# Patient Record
Sex: MALE | Race: WHITE | NOT HISPANIC OR LATINO | Employment: FULL TIME | URBAN - METROPOLITAN AREA
[De-identification: names, ages, dates, MRNs, and addresses within clinical notes are randomized per-mention and may not be internally consistent; named-entity substitution may affect disease eponyms.]

---

## 2023-12-20 ENCOUNTER — HOSPITAL ENCOUNTER (EMERGENCY)
Facility: HOSPITAL | Age: 58
Discharge: HOME | End: 2023-12-20
Attending: STUDENT IN AN ORGANIZED HEALTH CARE EDUCATION/TRAINING PROGRAM
Payer: MEDICARE

## 2023-12-20 ENCOUNTER — APPOINTMENT (OUTPATIENT)
Dept: RADIOLOGY | Facility: HOSPITAL | Age: 58
End: 2023-12-20
Payer: MEDICARE

## 2023-12-20 VITALS
WEIGHT: 235 LBS | SYSTOLIC BLOOD PRESSURE: 165 MMHG | HEIGHT: 67 IN | TEMPERATURE: 97.3 F | BODY MASS INDEX: 36.88 KG/M2 | DIASTOLIC BLOOD PRESSURE: 101 MMHG | HEART RATE: 86 BPM | RESPIRATION RATE: 20 BRPM | OXYGEN SATURATION: 97 %

## 2023-12-20 DIAGNOSIS — M79.672 PAIN OF LEFT HEEL: Primary | ICD-10-CM

## 2023-12-20 PROCEDURE — 73630 X-RAY EXAM OF FOOT: CPT | Mod: LT

## 2023-12-20 PROCEDURE — 99284 EMERGENCY DEPT VISIT MOD MDM: CPT | Performed by: STUDENT IN AN ORGANIZED HEALTH CARE EDUCATION/TRAINING PROGRAM

## 2023-12-20 PROCEDURE — 99283 EMERGENCY DEPT VISIT LOW MDM: CPT | Mod: 25

## 2023-12-20 PROCEDURE — 2500000004 HC RX 250 GENERAL PHARMACY W/ HCPCS (ALT 636 FOR OP/ED): Performed by: STUDENT IN AN ORGANIZED HEALTH CARE EDUCATION/TRAINING PROGRAM

## 2023-12-20 PROCEDURE — 96372 THER/PROPH/DIAG INJ SC/IM: CPT

## 2023-12-20 PROCEDURE — 73630 X-RAY EXAM OF FOOT: CPT | Mod: LEFT SIDE | Performed by: RADIOLOGY

## 2023-12-20 RX ORDER — ORPHENADRINE CITRATE 100 MG/1
100 TABLET, EXTENDED RELEASE ORAL ONCE
Status: COMPLETED | OUTPATIENT
Start: 2023-12-20 | End: 2023-12-20

## 2023-12-20 RX ORDER — ACETAMINOPHEN 325 MG/1
650 TABLET ORAL ONCE
Status: COMPLETED | OUTPATIENT
Start: 2023-12-20 | End: 2023-12-20

## 2023-12-20 RX ORDER — KETOROLAC TROMETHAMINE 30 MG/ML
30 INJECTION, SOLUTION INTRAMUSCULAR; INTRAVENOUS ONCE
Status: COMPLETED | OUTPATIENT
Start: 2023-12-20 | End: 2023-12-20

## 2023-12-20 RX ADMIN — ORPHENADRINE CITRATE 100 MG: 100 TABLET, EXTENDED RELEASE ORAL at 03:18

## 2023-12-20 RX ADMIN — ACETAMINOPHEN 650 MG: 325 TABLET ORAL at 03:18

## 2023-12-20 RX ADMIN — KETOROLAC TROMETHAMINE 30 MG: 30 INJECTION, SOLUTION INTRAMUSCULAR at 03:18

## 2023-12-20 ASSESSMENT — LIFESTYLE VARIABLES
HAVE YOU EVER FELT YOU SHOULD CUT DOWN ON YOUR DRINKING: NO
REASON UNABLE TO ASSESS: NO
EVER FELT BAD OR GUILTY ABOUT YOUR DRINKING: NO
HAVE PEOPLE ANNOYED YOU BY CRITICIZING YOUR DRINKING: NO
EVER HAD A DRINK FIRST THING IN THE MORNING TO STEADY YOUR NERVES TO GET RID OF A HANGOVER: NO

## 2023-12-20 ASSESSMENT — COLUMBIA-SUICIDE SEVERITY RATING SCALE - C-SSRS
6. HAVE YOU EVER DONE ANYTHING, STARTED TO DO ANYTHING, OR PREPARED TO DO ANYTHING TO END YOUR LIFE?: NO
1. IN THE PAST MONTH, HAVE YOU WISHED YOU WERE DEAD OR WISHED YOU COULD GO TO SLEEP AND NOT WAKE UP?: NO
2. HAVE YOU ACTUALLY HAD ANY THOUGHTS OF KILLING YOURSELF?: NO

## 2023-12-20 NOTE — ED PROVIDER NOTES
Chief Complaint   Patient presents with    Foot Injury     Pt c/o left heel pain since Sunday. Denies injury or accident     History of Present Illness   58-year-old who presents to the emergency department due to left heel pain.  Patient reports his symptoms began on Sunday and have progressively worsened.  He does note that he has been working on a running in order to become more healthy and since this increase in activity he has began to have left heel pain with walking most notably when plantar flexing.  No acute traumatic event that he can recall.  No previous injuries to his left ankle.  He is able to ambulate however with some associated pain.    Physical Exam     ED Triage Vitals [12/20/23 0030]   Temp Heart Rate Resp BP   36.3 °C (97.3 °F) 88 20 (!) 180/109      SpO2 Temp src Heart Rate Source Patient Position   96 % -- -- --      BP Location FiO2 (%)     -- --       Vital signs reviewed in nursing triage note, EMR flow sheets, and at patient's bedside.  General:  No acute distress.  Head: Atraumatic.  Neck: Supple.  Eyes: No conjunctival injection.   Ears Nose Throat: Hearing grossly intact. No epistaxis. Oral mucosa moist.  Cardiovascular: Extremities warm. Regular rhythm.   Respiratory: No stridor. No conversational dyspnea.  Gastrointestinal: No abdominal distention.  Genitourinary: No CVA tenderness.  Musculoskeletal: 2+ DP and PT palpable in left foot no bony tenderness at the base of the fifth metatarsal along the midfoot or along the bilateral malleoli.  Posterior heel reveals some associated erythema and tenderness to palpation plantarflexion and dorsiflexion intact along with inversion and eversion.  Sensation grossly intact.  Skin: No rash.  Psychiatric: Does not appear to respond to internal stimuli.  Neurologic: Alert. Follows directions. Face symmetric. No aphasia or dysarthria. Symmetric movement of upper and lower extremities.         ED Course & Medical Decision Making   58-year-old male  past medical history as above presents emergency department due to heel pain.  Upon presentation he is hemodynamically stable and afebrile.  Physical exam reveals some associated erythema and tenderness and along the posterior aspect of the heel.  No other acute findings.  Both x-ray and physical exam are most consistent with Achilles tendinopathy.  No acute fracture, dislocation, vascular, or infectious etiology appreciated on exam.  Plan will be for symptomatic management and outpatient follow-up for which patient already has an appointment scheduled.            Jacinto Branch MD  Resident  12/20/23 [id number]     [facility]

## 2024-08-11 ENCOUNTER — APPOINTMENT (OUTPATIENT)
Dept: RADIOLOGY | Facility: HOSPITAL | Age: 59
End: 2024-08-11
Payer: MEDICARE

## 2024-08-11 ENCOUNTER — ANESTHESIA EVENT (OUTPATIENT)
Dept: OPERATING ROOM | Facility: HOSPITAL | Age: 59
End: 2024-08-11
Payer: MEDICARE

## 2024-08-11 ENCOUNTER — PREP FOR PROCEDURE (OUTPATIENT)
Dept: SURGERY | Facility: HOSPITAL | Age: 59
End: 2024-08-11
Payer: MEDICARE

## 2024-08-11 ENCOUNTER — PREP FOR PROCEDURE (OUTPATIENT)
Dept: SURGERY | Facility: HOSPITAL | Age: 59
End: 2024-08-11

## 2024-08-11 ENCOUNTER — ANESTHESIA (OUTPATIENT)
Dept: OPERATING ROOM | Facility: HOSPITAL | Age: 59
End: 2024-08-11
Payer: MEDICARE

## 2024-08-11 ENCOUNTER — APPOINTMENT (OUTPATIENT)
Dept: CARDIOLOGY | Facility: HOSPITAL | Age: 59
End: 2024-08-11
Payer: MEDICARE

## 2024-08-11 ENCOUNTER — HOSPITAL ENCOUNTER (OUTPATIENT)
Facility: HOSPITAL | Age: 59
Setting detail: OBSERVATION
End: 2024-08-11
Attending: EMERGENCY MEDICINE | Admitting: SURGERY
Payer: MEDICARE

## 2024-08-11 VITALS
HEART RATE: 104 BPM | WEIGHT: 240 LBS | BODY MASS INDEX: 37.67 KG/M2 | HEIGHT: 67 IN | DIASTOLIC BLOOD PRESSURE: 83 MMHG | OXYGEN SATURATION: 92 % | TEMPERATURE: 98.2 F | SYSTOLIC BLOOD PRESSURE: 151 MMHG | RESPIRATION RATE: 18 BRPM

## 2024-08-11 DIAGNOSIS — K80.00 ACUTE CALCULOUS CHOLECYSTITIS: ICD-10-CM

## 2024-08-11 DIAGNOSIS — K80.10 CALCULUS OF GALLBLADDER WITH CHRONIC CHOLECYSTITIS WITHOUT OBSTRUCTION: Primary | ICD-10-CM

## 2024-08-11 DIAGNOSIS — K80.50 BILIARY COLIC: Primary | ICD-10-CM

## 2024-08-11 DIAGNOSIS — K80.10 CALCULUS OF GALLBLADDER WITH CHRONIC CHOLECYSTITIS WITHOUT OBSTRUCTION: ICD-10-CM

## 2024-08-11 DIAGNOSIS — K80.51 CALCULUS OF BILE DUCT WITHOUT CHOLECYSTITIS WITH OBSTRUCTION: ICD-10-CM

## 2024-08-11 LAB
ALBUMIN SERPL BCP-MCNC: 4.4 G/DL (ref 3.4–5)
ALP SERPL-CCNC: 50 U/L (ref 33–120)
ALT SERPL W P-5'-P-CCNC: 20 U/L (ref 10–52)
ANION GAP SERPL CALC-SCNC: 14 MMOL/L (ref 10–20)
APPEARANCE UR: CLEAR
AST SERPL W P-5'-P-CCNC: 20 U/L (ref 9–39)
BASOPHILS # BLD AUTO: 0.01 X10*3/UL (ref 0–0.1)
BASOPHILS NFR BLD AUTO: 0.1 %
BILIRUB SERPL-MCNC: 1 MG/DL (ref 0–1.2)
BILIRUB UR STRIP.AUTO-MCNC: NEGATIVE MG/DL
BUN SERPL-MCNC: 12 MG/DL (ref 6–23)
CALCIUM SERPL-MCNC: 9.4 MG/DL (ref 8.6–10.3)
CHLORIDE SERPL-SCNC: 100 MMOL/L (ref 98–107)
CO2 SERPL-SCNC: 26 MMOL/L (ref 21–32)
COLOR UR: YELLOW
CREAT SERPL-MCNC: 0.86 MG/DL (ref 0.5–1.3)
EGFRCR SERPLBLD CKD-EPI 2021: >90 ML/MIN/1.73M*2
EOSINOPHIL # BLD AUTO: 0.01 X10*3/UL (ref 0–0.7)
EOSINOPHIL NFR BLD AUTO: 0.1 %
ERYTHROCYTE [DISTWIDTH] IN BLOOD BY AUTOMATED COUNT: 13.2 % (ref 11.5–14.5)
GLUCOSE SERPL-MCNC: 136 MG/DL (ref 74–99)
GLUCOSE UR STRIP.AUTO-MCNC: NEGATIVE MG/DL
HCT VFR BLD AUTO: 47.5 % (ref 41–52)
HGB BLD-MCNC: 16 G/DL (ref 13.5–17.5)
IMM GRANULOCYTES # BLD AUTO: 0.04 X10*3/UL (ref 0–0.7)
IMM GRANULOCYTES NFR BLD AUTO: 0.4 % (ref 0–0.9)
KETONES UR STRIP.AUTO-MCNC: ABNORMAL MG/DL
LEUKOCYTE ESTERASE UR QL STRIP.AUTO: NEGATIVE
LIPASE SERPL-CCNC: 38 U/L (ref 9–82)
LYMPHOCYTES # BLD AUTO: 0.93 X10*3/UL (ref 1.2–4.8)
LYMPHOCYTES NFR BLD AUTO: 10.1 %
MCH RBC QN AUTO: 30.7 PG (ref 26–34)
MCHC RBC AUTO-ENTMCNC: 33.7 G/DL (ref 32–36)
MCV RBC AUTO: 91 FL (ref 80–100)
MONOCYTES # BLD AUTO: 0.89 X10*3/UL (ref 0.1–1)
MONOCYTES NFR BLD AUTO: 9.6 %
NEUTROPHILS # BLD AUTO: 7.36 X10*3/UL (ref 1.2–7.7)
NEUTROPHILS NFR BLD AUTO: 79.7 %
NITRITE UR QL STRIP.AUTO: NEGATIVE
NRBC BLD-RTO: 0 /100 WBCS (ref 0–0)
PH UR STRIP.AUTO: 6 [PH]
PLATELET # BLD AUTO: 243 X10*3/UL (ref 150–450)
POTASSIUM SERPL-SCNC: 4 MMOL/L (ref 3.5–5.3)
PROT SERPL-MCNC: 7.6 G/DL (ref 6.4–8.2)
PROT UR STRIP.AUTO-MCNC: ABNORMAL MG/DL
RBC # BLD AUTO: 5.21 X10*6/UL (ref 4.5–5.9)
RBC # UR STRIP.AUTO: ABNORMAL /UL
RBC #/AREA URNS AUTO: ABNORMAL /HPF
SODIUM SERPL-SCNC: 136 MMOL/L (ref 136–145)
SP GR UR STRIP.AUTO: 1.02
SQUAMOUS #/AREA URNS AUTO: ABNORMAL /HPF
UROBILINOGEN UR STRIP.AUTO-MCNC: ABNORMAL MG/DL
WBC # BLD AUTO: 9.2 X10*3/UL (ref 4.4–11.3)
WBC #/AREA URNS AUTO: ABNORMAL /HPF
YEAST BUDDING #/AREA UR COMP ASSIST: PRESENT /HPF

## 2024-08-11 PROCEDURE — 2550000001 HC RX 255 CONTRASTS: Performed by: EMERGENCY MEDICINE

## 2024-08-11 PROCEDURE — 85025 COMPLETE CBC W/AUTO DIFF WBC: CPT

## 2024-08-11 PROCEDURE — 2780000003 HC OR 278 NO HCPCS: Performed by: SURGERY

## 2024-08-11 PROCEDURE — 2500000004 HC RX 250 GENERAL PHARMACY W/ HCPCS (ALT 636 FOR OP/ED)

## 2024-08-11 PROCEDURE — 47562 LAPAROSCOPIC CHOLECYSTECTOMY: CPT | Performed by: SURGERY

## 2024-08-11 PROCEDURE — 96372 THER/PROPH/DIAG INJ SC/IM: CPT | Performed by: SURGERY

## 2024-08-11 PROCEDURE — 3600000008 HC OR TIME - EACH INCREMENTAL 1 MINUTE - PROCEDURE LEVEL THREE: Performed by: SURGERY

## 2024-08-11 PROCEDURE — 96376 TX/PRO/DX INJ SAME DRUG ADON: CPT | Mod: 59

## 2024-08-11 PROCEDURE — 36415 COLL VENOUS BLD VENIPUNCTURE: CPT

## 2024-08-11 PROCEDURE — 3600000003 HC OR TIME - INITIAL BASE CHARGE - PROCEDURE LEVEL THREE: Performed by: SURGERY

## 2024-08-11 PROCEDURE — 7100000001 HC RECOVERY ROOM TIME - INITIAL BASE CHARGE: Performed by: SURGERY

## 2024-08-11 PROCEDURE — 80053 COMPREHEN METABOLIC PANEL: CPT

## 2024-08-11 PROCEDURE — C1729 CATH, DRAINAGE: HCPCS | Performed by: SURGERY

## 2024-08-11 PROCEDURE — 2500000004 HC RX 250 GENERAL PHARMACY W/ HCPCS (ALT 636 FOR OP/ED): Performed by: ANESTHESIOLOGY

## 2024-08-11 PROCEDURE — 2500000004 HC RX 250 GENERAL PHARMACY W/ HCPCS (ALT 636 FOR OP/ED): Performed by: SURGERY

## 2024-08-11 PROCEDURE — 2500000005 HC RX 250 GENERAL PHARMACY W/O HCPCS: Performed by: SURGERY

## 2024-08-11 PROCEDURE — 93005 ELECTROCARDIOGRAM TRACING: CPT | Mod: 59

## 2024-08-11 PROCEDURE — 74177 CT ABD & PELVIS W/CONTRAST: CPT | Performed by: RADIOLOGY

## 2024-08-11 PROCEDURE — 2500000005 HC RX 250 GENERAL PHARMACY W/O HCPCS: Performed by: ANESTHESIOLOGIST ASSISTANT

## 2024-08-11 PROCEDURE — 81001 URINALYSIS AUTO W/SCOPE: CPT

## 2024-08-11 PROCEDURE — 2500000001 HC RX 250 WO HCPCS SELF ADMINISTERED DRUGS (ALT 637 FOR MEDICARE OP)

## 2024-08-11 PROCEDURE — 76705 ECHO EXAM OF ABDOMEN: CPT | Mod: FOREIGN READ | Performed by: RADIOLOGY

## 2024-08-11 PROCEDURE — 71260 CT THORAX DX C+: CPT | Performed by: RADIOLOGY

## 2024-08-11 PROCEDURE — 74177 CT ABD & PELVIS W/CONTRAST: CPT

## 2024-08-11 PROCEDURE — 2500000001 HC RX 250 WO HCPCS SELF ADMINISTERED DRUGS (ALT 637 FOR MEDICARE OP): Performed by: SURGERY

## 2024-08-11 PROCEDURE — 87116 MYCOBACTERIA CULTURE: CPT | Mod: PARLAB | Performed by: SURGERY

## 2024-08-11 PROCEDURE — 3700000002 HC GENERAL ANESTHESIA TIME - EACH INCREMENTAL 1 MINUTE: Performed by: SURGERY

## 2024-08-11 PROCEDURE — 83690 ASSAY OF LIPASE: CPT

## 2024-08-11 PROCEDURE — 3700000001 HC GENERAL ANESTHESIA TIME - INITIAL BASE CHARGE: Performed by: SURGERY

## 2024-08-11 PROCEDURE — 99285 EMERGENCY DEPT VISIT HI MDM: CPT

## 2024-08-11 PROCEDURE — 2720000007 HC OR 272 NO HCPCS: Performed by: SURGERY

## 2024-08-11 PROCEDURE — 99223 1ST HOSP IP/OBS HIGH 75: CPT | Performed by: SURGERY

## 2024-08-11 PROCEDURE — C9113 INJ PANTOPRAZOLE SODIUM, VIA: HCPCS

## 2024-08-11 PROCEDURE — G0378 HOSPITAL OBSERVATION PER HR: HCPCS

## 2024-08-11 PROCEDURE — 96375 TX/PRO/DX INJ NEW DRUG ADDON: CPT | Mod: 59

## 2024-08-11 PROCEDURE — 2500000004 HC RX 250 GENERAL PHARMACY W/ HCPCS (ALT 636 FOR OP/ED): Performed by: ANESTHESIOLOGIST ASSISTANT

## 2024-08-11 PROCEDURE — 76705 ECHO EXAM OF ABDOMEN: CPT

## 2024-08-11 PROCEDURE — 88304 TISSUE EXAM BY PATHOLOGIST: CPT | Mod: TC,PARLAB | Performed by: SURGERY

## 2024-08-11 PROCEDURE — 7100000002 HC RECOVERY ROOM TIME - EACH INCREMENTAL 1 MINUTE: Performed by: SURGERY

## 2024-08-11 RX ORDER — ONDANSETRON HYDROCHLORIDE 2 MG/ML
4 INJECTION, SOLUTION INTRAVENOUS ONCE
Status: COMPLETED | OUTPATIENT
Start: 2024-08-11 | End: 2024-08-11

## 2024-08-11 RX ORDER — SCOLOPAMINE TRANSDERMAL SYSTEM 1 MG/1
1 PATCH, EXTENDED RELEASE TRANSDERMAL ONCE
Status: DISCONTINUED | OUTPATIENT
Start: 2024-08-11 | End: 2024-08-11 | Stop reason: HOSPADM

## 2024-08-11 RX ORDER — ASPIRIN 81 MG/1
162 TABLET ORAL DAILY
Status: ON HOLD | COMMUNITY

## 2024-08-11 RX ORDER — MORPHINE SULFATE 4 MG/ML
4 INJECTION, SOLUTION INTRAMUSCULAR; INTRAVENOUS ONCE
Status: COMPLETED | OUTPATIENT
Start: 2024-08-11 | End: 2024-08-11

## 2024-08-11 RX ORDER — LABETALOL HYDROCHLORIDE 5 MG/ML
10 INJECTION, SOLUTION INTRAVENOUS ONCE AS NEEDED
Status: DISCONTINUED | OUTPATIENT
Start: 2024-08-11 | End: 2024-08-11 | Stop reason: HOSPADM

## 2024-08-11 RX ORDER — ONDANSETRON HYDROCHLORIDE 2 MG/ML
4 INJECTION, SOLUTION INTRAVENOUS EVERY 8 HOURS PRN
Status: ACTIVE | OUTPATIENT
Start: 2024-08-11

## 2024-08-11 RX ORDER — ROCURONIUM BROMIDE 10 MG/ML
INJECTION, SOLUTION INTRAVENOUS AS NEEDED
Status: DISCONTINUED | OUTPATIENT
Start: 2024-08-11 | End: 2024-08-11

## 2024-08-11 RX ORDER — DICYCLOMINE HYDROCHLORIDE 10 MG/1
10 CAPSULE ORAL ONCE
Status: COMPLETED | OUTPATIENT
Start: 2024-08-11 | End: 2024-08-11

## 2024-08-11 RX ORDER — MORPHINE SULFATE 2 MG/ML
2 INJECTION, SOLUTION INTRAMUSCULAR; INTRAVENOUS EVERY 5 MIN PRN
Status: DISCONTINUED | OUTPATIENT
Start: 2024-08-11 | End: 2024-08-11 | Stop reason: HOSPADM

## 2024-08-11 RX ORDER — HEPARIN SODIUM 5000 [USP'U]/ML
5000 INJECTION, SOLUTION INTRAVENOUS; SUBCUTANEOUS EVERY 8 HOURS
Status: DISPENSED | OUTPATIENT
Start: 2024-08-11

## 2024-08-11 RX ORDER — MEPERIDINE HYDROCHLORIDE 25 MG/ML
INJECTION INTRAMUSCULAR; INTRAVENOUS; SUBCUTANEOUS AS NEEDED
Status: DISCONTINUED | OUTPATIENT
Start: 2024-08-11 | End: 2024-08-11

## 2024-08-11 RX ORDER — HYDROMORPHONE HYDROCHLORIDE 1 MG/ML
1 INJECTION, SOLUTION INTRAMUSCULAR; INTRAVENOUS; SUBCUTANEOUS EVERY 5 MIN PRN
Status: DISCONTINUED | OUTPATIENT
Start: 2024-08-11 | End: 2024-08-11 | Stop reason: HOSPADM

## 2024-08-11 RX ORDER — DOCUSATE SODIUM 100 MG/1
100 CAPSULE, LIQUID FILLED ORAL 2 TIMES DAILY
Status: DISPENSED | OUTPATIENT
Start: 2024-08-11

## 2024-08-11 RX ORDER — ALBUTEROL SULFATE 0.83 MG/ML
2.5 SOLUTION RESPIRATORY (INHALATION) ONCE AS NEEDED
Status: DISCONTINUED | OUTPATIENT
Start: 2024-08-11 | End: 2024-08-11 | Stop reason: HOSPADM

## 2024-08-11 RX ORDER — PROMETHAZINE HYDROCHLORIDE 25 MG/1
25 SUPPOSITORY RECTAL EVERY 12 HOURS PRN
Status: DISPENSED | OUTPATIENT
Start: 2024-08-11

## 2024-08-11 RX ORDER — FAMOTIDINE 10 MG/ML
20 INJECTION INTRAVENOUS 2 TIMES DAILY
Status: ACTIVE | OUTPATIENT
Start: 2024-08-11

## 2024-08-11 RX ORDER — HYDROMORPHONE HYDROCHLORIDE 1 MG/ML
1 INJECTION, SOLUTION INTRAMUSCULAR; INTRAVENOUS; SUBCUTANEOUS
Status: ACTIVE | OUTPATIENT
Start: 2024-08-11

## 2024-08-11 RX ORDER — NALOXONE HYDROCHLORIDE 1 MG/ML
0.2 INJECTION INTRAMUSCULAR; INTRAVENOUS; SUBCUTANEOUS EVERY 5 MIN PRN
Status: ACTIVE | OUTPATIENT
Start: 2024-08-11

## 2024-08-11 RX ORDER — ACETAMINOPHEN 325 MG/1
650 TABLET ORAL EVERY 6 HOURS
Status: DISPENSED | OUTPATIENT
Start: 2024-08-11

## 2024-08-11 RX ORDER — BUPIVACAINE HCL/EPINEPHRINE 0.5-1:200K
VIAL (ML) INJECTION AS NEEDED
Status: DISCONTINUED | OUTPATIENT
Start: 2024-08-11 | End: 2024-08-11 | Stop reason: HOSPADM

## 2024-08-11 RX ORDER — ONDANSETRON HYDROCHLORIDE 2 MG/ML
4 INJECTION, SOLUTION INTRAVENOUS ONCE AS NEEDED
Status: DISCONTINUED | OUTPATIENT
Start: 2024-08-11 | End: 2024-08-11 | Stop reason: HOSPADM

## 2024-08-11 RX ORDER — LIDOCAINE HCL/PF 100 MG/5ML
SYRINGE (ML) INTRAVENOUS AS NEEDED
Status: DISCONTINUED | OUTPATIENT
Start: 2024-08-11 | End: 2024-08-11

## 2024-08-11 RX ORDER — ONDANSETRON HYDROCHLORIDE 2 MG/ML
INJECTION, SOLUTION INTRAVENOUS AS NEEDED
Status: DISCONTINUED | OUTPATIENT
Start: 2024-08-11 | End: 2024-08-11

## 2024-08-11 RX ORDER — MIDAZOLAM HYDROCHLORIDE 1 MG/ML
1 INJECTION, SOLUTION INTRAMUSCULAR; INTRAVENOUS ONCE AS NEEDED
Status: DISCONTINUED | OUTPATIENT
Start: 2024-08-11 | End: 2024-08-11 | Stop reason: HOSPADM

## 2024-08-11 RX ORDER — MORPHINE SULFATE 4 MG/ML
INJECTION, SOLUTION INTRAMUSCULAR; INTRAVENOUS
Status: COMPLETED
Start: 2024-08-11 | End: 2024-08-11

## 2024-08-11 RX ORDER — FAMOTIDINE 10 MG/ML
20 INJECTION INTRAVENOUS ONCE
Status: DISCONTINUED | OUTPATIENT
Start: 2024-08-11 | End: 2024-08-11 | Stop reason: HOSPADM

## 2024-08-11 RX ORDER — DICLOFENAC SODIUM 10 MG/G
4 GEL TOPICAL 4 TIMES DAILY PRN
Status: ON HOLD | COMMUNITY

## 2024-08-11 RX ORDER — SODIUM CHLORIDE, SODIUM LACTATE, POTASSIUM CHLORIDE, CALCIUM CHLORIDE 600; 310; 30; 20 MG/100ML; MG/100ML; MG/100ML; MG/100ML
100 INJECTION, SOLUTION INTRAVENOUS CONTINUOUS
Status: DISCONTINUED | OUTPATIENT
Start: 2024-08-11 | End: 2024-08-11 | Stop reason: HOSPADM

## 2024-08-11 RX ORDER — PROPOFOL 10 MG/ML
INJECTION, EMULSION INTRAVENOUS AS NEEDED
Status: DISCONTINUED | OUTPATIENT
Start: 2024-08-11 | End: 2024-08-11

## 2024-08-11 RX ORDER — DIPHENHYDRAMINE HYDROCHLORIDE 50 MG/ML
25 INJECTION INTRAMUSCULAR; INTRAVENOUS ONCE AS NEEDED
Status: DISCONTINUED | OUTPATIENT
Start: 2024-08-11 | End: 2024-08-11 | Stop reason: HOSPADM

## 2024-08-11 RX ORDER — PROMETHAZINE HYDROCHLORIDE 25 MG/1
25 TABLET ORAL EVERY 6 HOURS PRN
Status: ACTIVE | OUTPATIENT
Start: 2024-08-11

## 2024-08-11 RX ORDER — ACETAMINOPHEN 325 MG/1
975 TABLET ORAL ONCE
Status: DISCONTINUED | OUTPATIENT
Start: 2024-08-11 | End: 2024-08-11 | Stop reason: HOSPADM

## 2024-08-11 RX ORDER — MIDAZOLAM HYDROCHLORIDE 1 MG/ML
INJECTION, SOLUTION INTRAMUSCULAR; INTRAVENOUS AS NEEDED
Status: DISCONTINUED | OUTPATIENT
Start: 2024-08-11 | End: 2024-08-11

## 2024-08-11 RX ORDER — SODIUM CHLORIDE, SODIUM LACTATE, POTASSIUM CHLORIDE, CALCIUM CHLORIDE 600; 310; 30; 20 MG/100ML; MG/100ML; MG/100ML; MG/100ML
100 INJECTION, SOLUTION INTRAVENOUS CONTINUOUS
Status: ACTIVE | OUTPATIENT
Start: 2024-08-11

## 2024-08-11 RX ORDER — CEFAZOLIN SODIUM 2 G/100ML
INJECTION, SOLUTION INTRAVENOUS
Status: DISPENSED
Start: 2024-08-11 | End: 2024-08-12

## 2024-08-11 RX ORDER — MULTIVIT-MIN/IRON FUM/FOLIC AC 7.5 MG-4
1 TABLET ORAL DAILY
Status: ON HOLD | COMMUNITY

## 2024-08-11 RX ORDER — PANTOPRAZOLE SODIUM 40 MG/10ML
40 INJECTION, POWDER, LYOPHILIZED, FOR SOLUTION INTRAVENOUS ONCE
Status: COMPLETED | OUTPATIENT
Start: 2024-08-11 | End: 2024-08-11

## 2024-08-11 RX ORDER — ONDANSETRON 4 MG/1
4 TABLET, ORALLY DISINTEGRATING ORAL EVERY 8 HOURS PRN
Status: ACTIVE | OUTPATIENT
Start: 2024-08-11

## 2024-08-11 RX ORDER — LABETALOL HYDROCHLORIDE 5 MG/ML
INJECTION, SOLUTION INTRAVENOUS AS NEEDED
Status: DISCONTINUED | OUTPATIENT
Start: 2024-08-11 | End: 2024-08-11

## 2024-08-11 RX ORDER — CEFAZOLIN 1 G/1
INJECTION, POWDER, FOR SOLUTION INTRAVENOUS AS NEEDED
Status: DISCONTINUED | OUTPATIENT
Start: 2024-08-11 | End: 2024-08-11

## 2024-08-11 RX ORDER — FENTANYL CITRATE 50 UG/ML
INJECTION, SOLUTION INTRAMUSCULAR; INTRAVENOUS AS NEEDED
Status: DISCONTINUED | OUTPATIENT
Start: 2024-08-11 | End: 2024-08-11

## 2024-08-11 RX ORDER — MEPERIDINE HYDROCHLORIDE 25 MG/ML
12.5 INJECTION INTRAMUSCULAR; INTRAVENOUS; SUBCUTANEOUS EVERY 10 MIN PRN
Status: DISCONTINUED | OUTPATIENT
Start: 2024-08-11 | End: 2024-08-11 | Stop reason: HOSPADM

## 2024-08-11 RX ORDER — IBUPROFEN 600 MG/1
600 TABLET ORAL EVERY 6 HOURS SCHEDULED
Status: DISPENSED | OUTPATIENT
Start: 2024-08-12

## 2024-08-11 RX ORDER — HYDRALAZINE HYDROCHLORIDE 20 MG/ML
10 INJECTION INTRAMUSCULAR; INTRAVENOUS EVERY 30 MIN PRN
Status: DISCONTINUED | OUTPATIENT
Start: 2024-08-11 | End: 2024-08-11 | Stop reason: HOSPADM

## 2024-08-11 RX ORDER — METOPROLOL TARTRATE 1 MG/ML
5 INJECTION, SOLUTION INTRAVENOUS ONCE
Status: DISCONTINUED | OUTPATIENT
Start: 2024-08-11 | End: 2024-08-11 | Stop reason: HOSPADM

## 2024-08-11 RX ORDER — CEFAZOLIN SODIUM 2 G/100ML
2 INJECTION, SOLUTION INTRAVENOUS EVERY 8 HOURS
Status: ACTIVE | OUTPATIENT
Start: 2024-08-12

## 2024-08-11 RX ORDER — FAMOTIDINE 20 MG/1
20 TABLET, FILM COATED ORAL 2 TIMES DAILY
Status: DISPENSED | OUTPATIENT
Start: 2024-08-11

## 2024-08-11 RX ADMIN — LIDOCAINE HYDROCHLORIDE 100 MG: 20 INJECTION INTRAVENOUS at 18:22

## 2024-08-11 RX ADMIN — IBUPROFEN 600 MG: 600 TABLET, FILM COATED ORAL at 23:43

## 2024-08-11 RX ADMIN — LABETALOL HYDROCHLORIDE 5 MG: 5 INJECTION, SOLUTION INTRAVENOUS at 19:05

## 2024-08-11 RX ADMIN — SODIUM CHLORIDE, POTASSIUM CHLORIDE, SODIUM LACTATE AND CALCIUM CHLORIDE: 600; 310; 30; 20 INJECTION, SOLUTION INTRAVENOUS at 18:13

## 2024-08-11 RX ADMIN — ONDANSETRON 4 MG: 2 INJECTION INTRAMUSCULAR; INTRAVENOUS at 19:39

## 2024-08-11 RX ADMIN — MEPERIDINE HYDROCHLORIDE 25 MG: 25 INJECTION INTRAMUSCULAR; INTRAVENOUS; SUBCUTANEOUS at 19:39

## 2024-08-11 RX ADMIN — SODIUM CHLORIDE, POTASSIUM CHLORIDE, SODIUM LACTATE AND CALCIUM CHLORIDE 100 ML/HR: 600; 310; 30; 20 INJECTION, SOLUTION INTRAVENOUS at 22:43

## 2024-08-11 RX ADMIN — SUGAMMADEX 200 MG: 100 INJECTION, SOLUTION INTRAVENOUS at 19:39

## 2024-08-11 RX ADMIN — ONDANSETRON 4 MG: 2 INJECTION INTRAMUSCULAR; INTRAVENOUS at 12:28

## 2024-08-11 RX ADMIN — ROCURONIUM BROMIDE 50 MG: 10 INJECTION, SOLUTION INTRAVENOUS at 18:22

## 2024-08-11 RX ADMIN — MEPERIDINE HYDROCHLORIDE 12.5 MG: 25 INJECTION INTRAMUSCULAR; INTRAVENOUS; SUBCUTANEOUS at 20:36

## 2024-08-11 RX ADMIN — DOCUSATE SODIUM 100 MG: 100 CAPSULE, LIQUID FILLED ORAL at 22:45

## 2024-08-11 RX ADMIN — MORPHINE SULFATE 4 MG: 4 INJECTION, SOLUTION INTRAMUSCULAR; INTRAVENOUS at 12:28

## 2024-08-11 RX ADMIN — ACETAMINOPHEN 650 MG: 325 TABLET ORAL at 22:45

## 2024-08-11 RX ADMIN — CEFAZOLIN 2 G: 330 INJECTION, POWDER, FOR SOLUTION INTRAMUSCULAR; INTRAVENOUS at 18:30

## 2024-08-11 RX ADMIN — ROCURONIUM BROMIDE 10 MG: 10 INJECTION, SOLUTION INTRAVENOUS at 19:00

## 2024-08-11 RX ADMIN — SODIUM CHLORIDE, POTASSIUM CHLORIDE, SODIUM LACTATE AND CALCIUM CHLORIDE: 600; 310; 30; 20 INJECTION, SOLUTION INTRAVENOUS at 19:39

## 2024-08-11 RX ADMIN — IOHEXOL 75 ML: 350 INJECTION, SOLUTION INTRAVENOUS at 13:10

## 2024-08-11 RX ADMIN — MEPERIDINE HYDROCHLORIDE 12.5 MG: 25 INJECTION INTRAMUSCULAR; INTRAVENOUS; SUBCUTANEOUS at 20:26

## 2024-08-11 RX ADMIN — HYDROMORPHONE HYDROCHLORIDE 1 MG: 1 INJECTION, SOLUTION INTRAMUSCULAR; INTRAVENOUS; SUBCUTANEOUS at 21:24

## 2024-08-11 RX ADMIN — PROPOFOL 200 MG: 10 INJECTION, EMULSION INTRAVENOUS at 18:22

## 2024-08-11 RX ADMIN — FENTANYL CITRATE 100 MCG: 50 INJECTION, SOLUTION INTRAMUSCULAR; INTRAVENOUS at 18:22

## 2024-08-11 RX ADMIN — HYDROMORPHONE HYDROCHLORIDE 1 MG: 1 INJECTION, SOLUTION INTRAMUSCULAR; INTRAVENOUS; SUBCUTANEOUS at 20:38

## 2024-08-11 RX ADMIN — MORPHINE SULFATE 4 MG: 4 INJECTION, SOLUTION INTRAMUSCULAR; INTRAVENOUS at 16:32

## 2024-08-11 RX ADMIN — SODIUM CHLORIDE, POTASSIUM CHLORIDE, SODIUM LACTATE AND CALCIUM CHLORIDE 100 ML/HR: 600; 310; 30; 20 INJECTION, SOLUTION INTRAVENOUS at 21:42

## 2024-08-11 RX ADMIN — FENTANYL CITRATE 50 MCG: 50 INJECTION, SOLUTION INTRAMUSCULAR; INTRAVENOUS at 18:40

## 2024-08-11 RX ADMIN — HYDROMORPHONE HYDROCHLORIDE 1 MG: 1 INJECTION, SOLUTION INTRAMUSCULAR; INTRAVENOUS; SUBCUTANEOUS at 20:56

## 2024-08-11 RX ADMIN — FENTANYL CITRATE 50 MCG: 50 INJECTION, SOLUTION INTRAMUSCULAR; INTRAVENOUS at 18:59

## 2024-08-11 RX ADMIN — HEPARIN SODIUM 5000 UNITS: 5000 INJECTION INTRAVENOUS; SUBCUTANEOUS at 22:46

## 2024-08-11 RX ADMIN — DEXAMETHASONE SODIUM PHOSPHATE 4 MG: 4 INJECTION, SOLUTION INTRAMUSCULAR; INTRAVENOUS at 18:42

## 2024-08-11 RX ADMIN — MIDAZOLAM 2 MG: 1 INJECTION INTRAMUSCULAR; INTRAVENOUS at 18:15

## 2024-08-11 RX ADMIN — ROCURONIUM BROMIDE 10 MG: 10 INJECTION, SOLUTION INTRAVENOUS at 18:47

## 2024-08-11 RX ADMIN — FAMOTIDINE 20 MG: 20 TABLET ORAL at 22:45

## 2024-08-11 RX ADMIN — DICYCLOMINE HYDROCHLORIDE 10 MG: 10 CAPSULE ORAL at 11:24

## 2024-08-11 RX ADMIN — PANTOPRAZOLE SODIUM 40 MG: 40 INJECTION, POWDER, FOR SOLUTION INTRAVENOUS at 11:23

## 2024-08-11 SDOH — SOCIAL STABILITY: SOCIAL INSECURITY: HAVE YOU HAD ANY THOUGHTS OF HARMING ANYONE ELSE?: NO

## 2024-08-11 SDOH — SOCIAL STABILITY: SOCIAL INSECURITY: DO YOU FEEL UNSAFE GOING BACK TO THE PLACE WHERE YOU ARE LIVING?: NO

## 2024-08-11 SDOH — SOCIAL STABILITY: SOCIAL INSECURITY: WERE YOU ABLE TO COMPLETE ALL THE BEHAVIORAL HEALTH SCREENINGS?: YES

## 2024-08-11 SDOH — SOCIAL STABILITY: SOCIAL INSECURITY: ABUSE: ADULT

## 2024-08-11 SDOH — SOCIAL STABILITY: SOCIAL INSECURITY: ARE THERE ANY APPARENT SIGNS OF INJURIES/BEHAVIORS THAT COULD BE RELATED TO ABUSE/NEGLECT?: NO

## 2024-08-11 SDOH — HEALTH STABILITY: MENTAL HEALTH: CURRENT SMOKER: 0

## 2024-08-11 SDOH — SOCIAL STABILITY: SOCIAL INSECURITY: DOES ANYONE TRY TO KEEP YOU FROM HAVING/CONTACTING OTHER FRIENDS OR DOING THINGS OUTSIDE YOUR HOME?: NO

## 2024-08-11 SDOH — SOCIAL STABILITY: SOCIAL INSECURITY: HAVE YOU HAD THOUGHTS OF HARMING ANYONE ELSE?: NO

## 2024-08-11 SDOH — SOCIAL STABILITY: SOCIAL INSECURITY: HAS ANYONE EVER THREATENED TO HURT YOUR FAMILY OR YOUR PETS?: NO

## 2024-08-11 SDOH — SOCIAL STABILITY: SOCIAL INSECURITY: DO YOU FEEL ANYONE HAS EXPLOITED OR TAKEN ADVANTAGE OF YOU FINANCIALLY OR OF YOUR PERSONAL PROPERTY?: NO

## 2024-08-11 SDOH — SOCIAL STABILITY: SOCIAL INSECURITY: ARE YOU OR HAVE YOU BEEN THREATENED OR ABUSED PHYSICALLY, EMOTIONALLY, OR SEXUALLY BY ANYONE?: NO

## 2024-08-11 ASSESSMENT — ACTIVITIES OF DAILY LIVING (ADL)
LACK_OF_TRANSPORTATION: NO
JUDGMENT_ADEQUATE_SAFELY_COMPLETE_DAILY_ACTIVITIES: YES
PATIENT'S MEMORY ADEQUATE TO SAFELY COMPLETE DAILY ACTIVITIES?: YES
FEEDING YOURSELF: INDEPENDENT
DRESSING YOURSELF: INDEPENDENT
HEARING - RIGHT EAR: FUNCTIONAL
ADEQUATE_TO_COMPLETE_ADL: YES
GROOMING: INDEPENDENT
TOILETING: INDEPENDENT
BATHING: INDEPENDENT
WALKS IN HOME: INDEPENDENT
HEARING - LEFT EAR: FUNCTIONAL

## 2024-08-11 ASSESSMENT — PAIN SCALES - GENERAL
PAINLEVEL_OUTOF10: 9
PAINLEVEL_OUTOF10: 6
PAINLEVEL_OUTOF10: 6
PAINLEVEL_OUTOF10: 7
PAINLEVEL_OUTOF10: 0 - NO PAIN
PAINLEVEL_OUTOF10: 3
PAINLEVEL_OUTOF10: 7
PAINLEVEL_OUTOF10: 5 - MODERATE PAIN
PAINLEVEL_OUTOF10: 0 - NO PAIN
PAINLEVEL_OUTOF10: 7
PAINLEVEL_OUTOF10: 10 - WORST POSSIBLE PAIN
PAIN_LEVEL: 0

## 2024-08-11 ASSESSMENT — COGNITIVE AND FUNCTIONAL STATUS - GENERAL
DRESSING REGULAR UPPER BODY CLOTHING: A LITTLE
PATIENT BASELINE BEDBOUND: NO
MOBILITY SCORE: 18
CLIMB 3 TO 5 STEPS WITH RAILING: A LITTLE
DRESSING REGULAR LOWER BODY CLOTHING: A LITTLE
MOVING FROM LYING ON BACK TO SITTING ON SIDE OF FLAT BED WITH BEDRAILS: A LITTLE
HELP NEEDED FOR BATHING: A LITTLE
STANDING UP FROM CHAIR USING ARMS: A LITTLE
TURNING FROM BACK TO SIDE WHILE IN FLAT BAD: A LITTLE
WALKING IN HOSPITAL ROOM: A LITTLE
MOVING TO AND FROM BED TO CHAIR: A LITTLE
DAILY ACTIVITIY SCORE: 21

## 2024-08-11 ASSESSMENT — COLUMBIA-SUICIDE SEVERITY RATING SCALE - C-SSRS
6. HAVE YOU EVER DONE ANYTHING, STARTED TO DO ANYTHING, OR PREPARED TO DO ANYTHING TO END YOUR LIFE?: NO
2. HAVE YOU ACTUALLY HAD ANY THOUGHTS OF KILLING YOURSELF?: NO
6. HAVE YOU EVER DONE ANYTHING, STARTED TO DO ANYTHING, OR PREPARED TO DO ANYTHING TO END YOUR LIFE?: NO
2. HAVE YOU ACTUALLY HAD ANY THOUGHTS OF KILLING YOURSELF?: NO
1. IN THE PAST MONTH, HAVE YOU WISHED YOU WERE DEAD OR WISHED YOU COULD GO TO SLEEP AND NOT WAKE UP?: NO
1. IN THE PAST MONTH, HAVE YOU WISHED YOU WERE DEAD OR WISHED YOU COULD GO TO SLEEP AND NOT WAKE UP?: NO

## 2024-08-11 ASSESSMENT — LIFESTYLE VARIABLES
SKIP TO QUESTIONS 9-10: 0
HOW OFTEN DO YOU HAVE A DRINK CONTAINING ALCOHOL: MONTHLY OR LESS
SUBSTANCE_ABUSE_PAST_12_MONTHS: NO
TOTAL SCORE: 0
PRESCIPTION_ABUSE_PAST_12_MONTHS: NO
HAVE PEOPLE ANNOYED YOU BY CRITICIZING YOUR DRINKING: NO
EVER HAD A DRINK FIRST THING IN THE MORNING TO STEADY YOUR NERVES TO GET RID OF A HANGOVER: NO
AUDIT-C TOTAL SCORE: 2
AUDIT-C TOTAL SCORE: 2
HOW OFTEN DO YOU HAVE 6 OR MORE DRINKS ON ONE OCCASION: NEVER
EVER FELT BAD OR GUILTY ABOUT YOUR DRINKING: NO
HOW MANY STANDARD DRINKS CONTAINING ALCOHOL DO YOU HAVE ON A TYPICAL DAY: 3 OR 4
HAVE YOU EVER FELT YOU SHOULD CUT DOWN ON YOUR DRINKING: NO

## 2024-08-11 ASSESSMENT — PATIENT HEALTH QUESTIONNAIRE - PHQ9
SUM OF ALL RESPONSES TO PHQ9 QUESTIONS 1 & 2: 0
2. FEELING DOWN, DEPRESSED OR HOPELESS: NOT AT ALL
1. LITTLE INTEREST OR PLEASURE IN DOING THINGS: NOT AT ALL

## 2024-08-11 ASSESSMENT — PAIN - FUNCTIONAL ASSESSMENT
PAIN_FUNCTIONAL_ASSESSMENT: 0-10

## 2024-08-11 ASSESSMENT — PAIN DESCRIPTION - DESCRIPTORS
DESCRIPTORS: PENETRATING
DESCRIPTORS: PENETRATING
DESCRIPTORS: SHARP
DESCRIPTORS: JABBING

## 2024-08-11 ASSESSMENT — PAIN DESCRIPTION - LOCATION: LOCATION: ABDOMEN

## 2024-08-11 NOTE — ANESTHESIA PREPROCEDURE EVALUATION
Patient: Yannick Lock    Procedure Information       Date/Time: 08/11/24 1730    Procedure: Cholecystectomy Laparoscopy with Cholangiogram (Abdomen) - Laparoscopic, possible open, cholecystectomy; possible cholangiogram    Location: [facility] 08 / [facility]    Surgeons: Lee Regan MD            Relevant Problems   Anesthesia   (-) Difficult intubation   (-) PONV (postoperative nausea and vomiting)      Liver   (+) Calculus of gallbladder with chronic cholecystitis without obstruction       Clinical information reviewed:   Tobacco  Allergies  Meds   Med Hx  Surg Hx   Fam Hx  Soc Hx        NPO Detail:  NPO/Void Status  Date of Last Liquid: 08/11/24  Time of Last Liquid: 1345  Date of Last Solid: 08/10/24  Time of Last Solid: 1700         Physical Exam    Airway  Mallampati: II  TM distance: >3 FB  Neck ROM: full     Cardiovascular - normal exam  Rhythm: regular  Rate: normal     Dental - normal exam     Pulmonary - normal exam     Abdominal            Anesthesia Plan    History of general anesthesia?: yes  History of complications of general anesthesia?: no    ASA 2     general     The patient is not a current smoker.  Education provided regarding risk of obstructive sleep apnea.  intravenous induction   Postoperative administration of opioids is intended.  Trial extubation is planned.  Anesthetic plan and risks discussed with patient.    Plan discussed with CAA, attending and CRNA.

## 2024-08-11 NOTE — ANESTHESIA PROCEDURE NOTES
Airway  Date/Time: 8/11/2024 6:24 PM  Urgency: elective    Airway not difficult    Staffing  Performed: LAMIN   Authorized by: Jose Luis Chi MD    Performed by: LAMIN Lucas  Patient location during procedure: OR    Indications and Patient Condition  Indications for airway management: anesthesia  Preoxygenated: yes  Mask difficulty assessment: 1 - vent by mask  Planned trial extubation    Final Airway Details  Final airway type: endotracheal airway      Successful airway: ETT  Cuffed: yes   Successful intubation technique: video laryngoscopy  Facilitating devices/methods: intubating stylet  Blade type: Alberts.  Blade size: #4  ETT size (mm): 7.0  Cormack-Lehane Classification: grade IIb - view of arytenoids or posterior of glottis only  Placement verified by: chest auscultation and capnometry   Measured from: lips  ETT to lips (cm): 21  Number of attempts at approach: 1

## 2024-08-11 NOTE — ED PROVIDER NOTES
HPI   Chief Complaint   Patient presents with    Abdominal Pain       Patient is a 59-year-old male with past medical history of kidney stones s/p lithotripsy, hernia repair presenting with concern for abdominal pain.  Endorses 35 hours of sharp pain located predominantly in the midepigastric region and right upper quadrant.  Endorses started after over eating pizza on Friday.  Endorses he has had similar pain in the past associated with eating steak or other red meats but has not had any episodes in the last year.  Describes the feeling as similar to indigestion.  Describes pain is very distinct from when he has had kidney stones in the past and does not endorse dysuria, urgency, frequency.  Endorses he is still having bowel movements including yesterday but endorses reduced frequency since the pain started due to poor p.o. intake.  Does endorse vague chills but denies fevers, malaise, cough, congestion or other infectious symptoms.  Endorses no burning in the throat, history of heart burn or GERD.            Patient History   No past medical history on file.  No past surgical history on file.  No family history on file.  Social History     Tobacco Use    Smoking status: Not on file    Smokeless tobacco: Not on file   Substance Use Topics    Alcohol use: Not on file    Drug use: Not on file       Physical Exam   ED Triage Vitals [08/11/24 0858]   Temperature Heart Rate Respirations BP   36 °C (96.8 °F) 86 18 (!) 167/95      Pulse Ox Temp src Heart Rate Source Patient Position   97 % -- -- --      BP Location FiO2 (%)     -- --       Physical Exam  Constitutional:       Appearance: Normal appearance.   HENT:      Head: Normocephalic and atraumatic.   Eyes:      Extraocular Movements: Extraocular movements intact.   Cardiovascular:      Rate and Rhythm: Normal rate.   Pulmonary:      Effort: Pulmonary effort is normal.   Abdominal:      Comments: Soft, obese, nondistended, no significant discomfort with deep  palpation in any quadrant.  Negative Chávez sign, negative psoas, negative obturator sign.  No CVA tenderness.   Musculoskeletal:         General: Normal range of motion.      Cervical back: Normal range of motion.   Skin:     General: Skin is warm and dry.   Neurological:      General: No focal deficit present.      Mental Status: He is alert and oriented to person, place, and time.   Psychiatric:         Mood and Affect: Mood normal.         Behavior: Behavior normal.           ED Course & MDM                  No data recorded                                 Medical Decision Making  EKG shows a normal rate of 82bpm, normal sinus rhythm, normal axis,  ms, QRS 84 ms, QTc 404 ms. Normal ST and T wave pattern with no evidence of acute ischemia or other acute findings.    Patient is a 59-year-old male with past medical history of kidney stones s/p lithotripsy, hernia repair presenting with concern for abdominal pain.  Sharp midepigastric/right upper quadrant pain associated with eating and otherwise normal vitals, benign abdominal exam most consistent with IBS versus gallstones versus GERD.  Right upper quadrant ultrasound without evidence of cholelithiasis.  Patient treated symptomatically with no significant improvement initially with Bentyl and Protonix.  Labs otherwise generally within normal limits with no leukocytosis.  Patient continued to have significant pain and was doubled over in hallway in pain and in shared decision making, treatment escalated to morphine and CT of abdomen pelvis ordered.  CT notable for enlarged gallbladder with stone in the cystic duct.  Otherwise labs with no bilirubin or alk phos elevation, no evidence of cholecystitis on CT or ultrasound.  Case discussed with Dr. Regan with plan for add-on cholecystectomy today.  Patient transferred to the OR for cholecystectomy with plan for discharge after observation in PACU.    Patient seen and discussed with Dr. Jimbo Stover MD,  PhD  Emergency Medicine PGY3          Procedure  Procedures     Juan Antonio Stover MD  Resident  08/11/24 5943

## 2024-08-11 NOTE — OP NOTE
Cholecystectomy Laparoscopy with Cholangiogram Operative Note     Date: 2024  OR Location: [facility]    Name: Yannick Lock : 1965, Age: 59 y.o., MRN: [id number], Sex: male    Diagnosis  Preoperative diagnosis: Cholelithiasis with cholecystitis Postoperative diagnosis: Acute calculus cholecystitis with hydrops     Procedures  Laparoscopic cholecystectomy    Surgeons      * Lee Regan - Primary    Resident/Fellow/Other Assistant:  Tera Almonte SA    Procedure Summary  Anesthesia: General  ASA: II  Anesthesia Staff: Anesthesiologist: Jose Luis Chi MD  C-AA: LAMIN Lucas  Estimated Blood Loss: 50 cc mL  Intra-op Medications: Administrations occurring from 1730 to 1930 on 24:  * No intraprocedure medications in log *           Anesthesia Record               Intraprocedure I/O Totals       None           Specimen:   ID Type Source Tests Collected by Time   1 : GallBladder Tissue GALLBLADDER CHOLECYSTECTOMY SURGICAL PATHOLOGY EXAM Lee Regan MD 2024 1627   A : Gallbladder culture Tissue GALLBLADDER CHOLECYSTECTOMY AFB CULTURE/SMEAR Lee Regan MD 2024 193        Staff:   Circulator: Cathy Rodriguez Person: Molly         Drains and/or Catheters: 10 flat fully-perforated Bryn-Cespedes drain to subhepatic space/jenn hepatis    Tourniquet Times:         Implants:     Findings: See below    Indications: Yannick Lock is an 59 y.o. male who is having surgery for Calculus of gallbladder with chronic cholecystitis without obstruction [K80.10].     The patient was seen in the preoperative area. The risks, benefits, complications, treatment options, non-operative alternatives, expected recovery and outcomes were discussed with the patient. The possibilities of reaction to medication, pulmonary aspiration, injury to surrounding structures, bleeding, recurrent infection, the need for additional procedures, failure to diagnose a condition, and creating a complication requiring  "transfusion or operation were discussed with the patient. The patient concurred with the proposed plan, giving informed consent.  The site of surgery was properly noted/marked if necessary per policy. The patient has been actively warmed in preoperative area. Preoperative antibiotics have been ordered and given within 1 hours of incision. Venous thrombosis prophylaxis have been ordered including bilateral sequential compression devices    Procedure Details:     Findings:    The gallbladder was hydropic; it was distended and filled with \"white bile\" indicating complete cystic duct obstruction; the gallbladder was acutely inflamed and thickened and hemorrhagic    Specimen:  Gallbladder    Culture:  Gallbladder    Procedure:     The patient was taken to the operating room and placed on the table in the supine position. Satisfactory general endotracheal anesthesia was achieved. The abdomen was prepared and draped in the normal sterile fashion. After the appropriate timeout, the case commenced.    A 3 centimeter supraumbilical midline incision was made and the skin and subcutaneous tissues were divided down to the supraumbilical fascia, which was elevated. A 10 mm longitudinal incision was made in supraumbilical fascia and access was obtained to the peritoneal cavity. The Rodriguez trocar was placed through this fascial defect and secured superiorly and inferiorly with 0 Vicryl sutures. Pneumoperitoneum was achieved the normal fashion. The patient was placed in reverse Trendelenburg position and tilted to the left. Under direct vision, 5 mm right subcostal trochars were placed in the subxiphoid, midclavicular, and anterior axillary line positions.    The above findings were noted.  The cholecystotomy was made in the fundus of the gallbladder, and the gallbladder was decompressed using the suction catheter.  The gallbladder was then grasped at the fundus via the lateral port and retracted anteriorly and superiorly.  The " infundibulum or Blane's pouch was grasped via the middle port and retracted anterolaterally and inferiorly, exposing the structures of the triangle of Calot.  Using careful and meticulous dissection, the cystic duct and cystic artery were identified and dissected free.  A clear posterior critical view of safety was obtained, with no evidence of additional posterior structures.  Posteriorly, the cystic artery was clipped twice proximally and once distally and divided.  Following this, the cystic duct was then clipped once proximally and 3 times distally and divided.   The gallbladder was then removed from its bed using electrocautery in a retrograde fashion. The gallbladder was placed in an Endopouch and removed through the supraumbilical fascial defect without difficulty, cultured and sent to pathology as a specimen.    The Rodriguez trocar was replaced, pneumoperitoneum re-achieved and exposure re-obtained. The gallbladder bed was inspected and copiously irrigated and suctioned. In addition, the subhepatic space and right gutter irrigated and suctioned. The gallbladder bed was dry. There was no evidence of bleeding or bile leak. The irrigant was clear.   A fully-perforated 10 mm flat Bryn-Cespedes drain was then placed in the subhepatic along the gallbladder bed and jenn hepatis, and exited through the 5 mm lateral trocar site, was secured to the skin is a 3-0 nylon suture, and ultimately placed to bulb suction.    Under direct vision, the trochars were removed with no evidence of bleeding. The pneumoperitoneum was evacuated. The supraumbilical fascial defect was approximated using interrupted figure-of-eight 0 Vicryl sutures. The skin at each incision was then approximated using stainless steel skin staples.  The wounds were cleaned and dried, and dry occlusive gauze dressings were placed.    The patient tolerated the procedure well. Sponge needle and instrument counts were correct times 2. Total IVF were 1000 cc  crystalloid. Blood loss was 50 cc. The patient was extubated in the operating room and taken to the recovery room with stable vital signs and in excellent condition.    Lee Regan M.D.  Complications:  None; patient tolerated the procedure well.    Disposition: PACU - hemodynamically stable.  Condition: stable         Additional Details: none    Attending Attestation: I performed the procedure.    Lee Regan  Phone Number: [phone]

## 2024-08-11 NOTE — H&P
History Of Present Illness  Yannick Lock is a 59 y.o. male presenting with midepigastric abdominal pain.  The patient has no primary medical physician.  He has not seen a physician for 3 years.  He takes no medications at home.  His only previous operations were bilateral inguinal hernia repairs as well as lithotripsy remotely.    2 nights ago, on the evening of 8/9/2024, the patient had pizza for dinner.  About 1:00 am in the morning that evening, early on the day of 8/10/2024 the patient developed the acute onset of midepigastric abdominal pain.  This lasted roughly 12 hours and then resolved.  He had associated nausea.  He ate some yogurt a candy bar and a soda later that afternoon, yesterday, and then developed recurrent symptoms which were more severe.  These were unrelenting.  The patient was unable to sleep last night.  He had associated chills.  He denies any fever or sweats.  He has no diarrhea or constipation.   he delivered to the emergency room this morning at about 8 AM for further evaluation and treatment.  Of note, the patient notes a similar episode of pain about a year ago which she attributed to indigestion.    Workup is documented in the relative results section.  Laboratory values were basically normal.  Ultrasound the gallbladder revealed sludge only with no other findings.  However, CT scan of the ab pelvis revealed a distended gallbladder with no gallbladder wall thickening but there was a calcified stone in the neck of the gallbladder which was not noted on sonography.  Otherwise negative.    Patient is single.  He has no children.  He lives in apartment in [place].  He is a .     Past Medical History  No past medical history on file.    Surgical History  No past surgical history on file.     Social History  He has no history on file for tobacco use, alcohol use, and drug use.    Family History  No family history on file.     Allergies  Patient has no known  allergies.    Review of Systems     Review of Systems    General: Not Present- Obesity, Cancer, HIV, MRSA, Recent Cold/Flu, Tired During the Day and VRE.  HEENT: Not Present- Migraine, Cataracts, Glaucoma, Macular Degeneration and Retinal Detachment.  Respiratory:Not Present- Asthma, Chronic Cough, Difficulty Breathing on Exertion, Difficulty Breathing at Rest, Emphysema, Frequent Bronchitis, Home CPAP/BiPAP, Home Oxygen, Pulmonary Embolus, Pneumonia/TB, Sleep Apnea and Snoring.  Cardiovascular: Not Present- Chest Pain, Congestive Heart Failure, Heart Attack, Coronary Artery Disease, Heart Stent, High Cholesterol/Lipids,Hypertension, Internal Defibrillator, Irregular Heart Beat, Mitral Valve Prolapse, Murmur, Pacemaker and Peripheral Vascular Disease.  Gastrointestinal: Not Present- Heartburn, Hepatitis, Hiatal Hernia, Jaundice, Reflux, Stomach Ulcer and IBS.  Male Genitourinary: Not Present- Enlarged Prostate, Kidney Failure, Kidney Stones, Dialysis and Urinary Tract Infection.  Musculoskeletal: Not Present- Arthritis, Back Pain and Fibromyalgia.  Neurological: Not Present- Headaches, Numbness, Tingling, Seizures, Stroke,  Shunt and Weakness.  Psychiatric: Not Present- Anxiety, Bipolar, Depression and Panic Attacks.  Endocrine: Not Present- Diabetes, Hyperthyroidism, Hypothyroidism and Low Blood Sugar.  Hematology: Not Present- Abnormal Bleeding, Anemia and Blood Clots.    Physical Exam     General Complete Physical Exam    The physical exam findings are as follows:    General Appearance - Cooperative and Well groomed. Not in acute distress. Build & Nutrition - Well nourished.  Posture - Normal posture. Gait - Normal. Hydration - Well hydrated.    Integumentary  General Characteristics: Overall examination of the patient's skin reveals - no rashes, no suspicious lesions, no bruises and no evidence of scars. Color - normal coloration of skin. Skin Moisture - normal skin moisture. Temperature - normal  warmth is  noted. Texture - normal skin texture.    Head and Neck  Head - normocephalic, atraumatic with no lesions or palpable masses.  Neck  Global Assessment - full range of motion. no bruit auscultated on the right, no bruit auscultated on the left, non-tender, no lymphadenopathy, no palpable mass on the right and no palpable mass on the left.  Trachea - midline.  Thyroid Gland Characteristics - no palpable nodules, normal position, symmetric and smooth.    Eyes  Sclera/Conjunctiva - Bilateral - Normal. Pupil - Bilateral - Accommodating, Direct reaction to light normal and Equal.    ENMT  Global Assessment  Upon examination of the ears, nose, mouth and throat - examination of the oral cavity reveals normal lips, teeth, gums and oral mucosa and hard and soft palates, tongue, tonsils and posterior pharynx are moist and symmetric without lesions.    Chest and Lung Exam  Inspection: Shape - Symmetric. Movements - Symmetrical. Accessory muscles - No use of accessory muscles in breathing.  Percussion:  Quality and Intensity: - Percussion normal.  Palpation: - Palpation normal.  Auscultation:  Breath sounds: - Normal and equal bilaterally.  Adventitious sounds: - none bilaterally.    Cardiovascular  Inspection: Carotid artery - Bilateral - Inspection Normal.  Palpation/Percussion: Examination by palpation and percussion reveals - No Thrills.  Cardiac Borders - Normal.  Auscultation: Rhythm - Regular. Rate: Regular.  Heart Sounds - Normal heart sounds, S1 WNL and S2 WNL.  Murmurs & Other Heart Sounds: Auscultation of the heart reveals - No Murmurs or other extra heart sounds.    Abdomen  Inspection: Inspection of the abdomen reveals - No Hernias.  Palpation/Percussion: Palpation and Percussion of the abdomen reveal - Non Tender and No Palpable abdominal  masses.  Liver: - Normal.  Spleen: - Normal.  Auscultation: Auscultation of the abdomen reveals - Bowel sounds normal.  Surgical scars: Bilateral inguinal    Inguinal  No inguinal  "or femoral hernias or lymphadenopathy bilaterally.    Rectal - Did not examine.    Peripheral Vascular  Lower Extremity: Inspection - Bilateral - No Varicose veins.  Palpation: Edema - Bilateral - No edema.    Musculoskeletal  Global Assessment  Right Upper Extremity - no deformities, masses or tenderness, no known fractures, normal strength and tone and  normal range of motion without pain. Left Upper Extremity - no deformities, masses or tenderness, no known fractures,  normal strength and tone and normal range of motion without pain. Right Lower Extremity - no deformities, masses or  tenderness, no known fractures, normal strength and tone and normal range of motion without pain. Left Lower  Extremity - no deformities, masses or tenderness, no known fractures, normal strength and tone and normal range of  motion without pain.    Lymphatic  Head & Neck  General Head & Neck Lymphatics:  Bilateral: Description - Normal.  Axillary  General Axillary Region:  Bilateral: Description - Normal.  Femoral & Inguinal  Generalized Femoral & Inguinal Lymphatics:  Bilateral: Description - Normal.  Last Recorded Vitals  Blood pressure (!) 167/95, pulse 86, temperature 36 °C (96.8 °F), resp. rate 18, height 1.702 m (5' 7\"), weight 109 kg (240 lb), SpO2 97%.    Relevant Results      Results for orders placed or performed during the hospital encounter of 08/11/24 (from the past 24 hour(s))   CBC and Auto Differential   Result Value Ref Range    WBC 9.2 4.4 - 11.3 x10*3/uL    nRBC 0.0 0.0 - 0.0 /100 WBCs    RBC 5.21 4.50 - 5.90 x10*6/uL    Hemoglobin 16.0 13.5 - 17.5 g/dL    Hematocrit 47.5 41.0 - 52.0 %    MCV 91 80 - 100 fL    MCH 30.7 26.0 - 34.0 pg    MCHC 33.7 32.0 - 36.0 g/dL    RDW 13.2 11.5 - 14.5 %    Platelets 243 150 - 450 x10*3/uL    Neutrophils % 79.7 40.0 - 80.0 %    Immature Granulocytes %, Automated 0.4 0.0 - 0.9 %    Lymphocytes % 10.1 13.0 - 44.0 %    Monocytes % 9.6 2.0 - 10.0 %    Eosinophils % 0.1 0.0 - 6.0 %    " Basophils % 0.1 0.0 - 2.0 %    Neutrophils Absolute 7.36 1.20 - 7.70 x10*3/uL    Immature Granulocytes Absolute, Automated 0.04 0.00 - 0.70 x10*3/uL    Lymphocytes Absolute 0.93 (L) 1.20 - 4.80 x10*3/uL    Monocytes Absolute 0.89 0.10 - 1.00 x10*3/uL    Eosinophils Absolute 0.01 0.00 - 0.70 x10*3/uL    Basophils Absolute 0.01 0.00 - 0.10 x10*3/uL   Comprehensive metabolic panel   Result Value Ref Range    Glucose 136 (H) 74 - 99 mg/dL    Sodium 136 136 - 145 mmol/L    Potassium 4.0 3.5 - 5.3 mmol/L    Chloride 100 98 - 107 mmol/L    Bicarbonate 26 21 - 32 mmol/L    Anion Gap 14 10 - 20 mmol/L    Urea Nitrogen 12 6 - 23 mg/dL    Creatinine 0.86 0.50 - 1.30 mg/dL    eGFR >90 >60 mL/min/1.73m*2    Calcium 9.4 8.6 - 10.3 mg/dL    Albumin 4.4 3.4 - 5.0 g/dL    Alkaline Phosphatase 50 33 - 120 U/L    Total Protein 7.6 6.4 - 8.2 g/dL    AST 20 9 - 39 U/L    Bilirubin, Total 1.0 0.0 - 1.2 mg/dL    ALT 20 10 - 52 U/L   Lipase   Result Value Ref Range    Lipase 38 9 - 82 U/L     CT chest abdomen pelvis w IV contrast    Result Date: 8/11/2024  Interpreted By:  Schoenberger, Joseph, STUDY: CT CHEST ABDOMEN PELVIS W IV CONTRAST;  8/11/2024 1:10 pm   INDICATION: Signs/Symptoms:epigastric abdominal pain.   COMPARISON: Abdomen and pelvis CT dated 11/14/2021   ACCESSION NUMBER(S): [id number]   ORDERING CLINICIAN: DONATO COTTON   TECHNIQUE: CT of the chest, abdomen, and pelvis was performed.  Contiguous axial images were obtained at 3 mm slice thickness through the chest, abdomen and pelvis. Coronal and sagittal reconstructions at 3 mm slice thickness were performed. 75 ml of contrast Omnipaque 350 were administered intravenously without immediate complication.   FINDINGS: CHEST:   LUNG/PLEURA/LARGE AIRWAYS: There is no pleural effusion, pneumothorax, or airspace opacity. The large airways are intact. There is some minimal dependent atelectasis in the right lateral posterior costophrenic angle.   VESSELS: Aorta and pulmonary  arteries are normal caliber. Mild atherosclerotic changes are noted of the aorta and branching vessels.  No coronary artery calcifications are present.   HEART: Normal size.  No pericardial effusion   MEDIASTINUM AND MARLINE: No mediastinal, hilar or axillary lymphadenopathy is present.  The esophagus is normal.   CHEST WALL AND LOWER NECK: The soft tissues of the chest wall demonstrate no gross abnormality. The thyroid appears normal. No supraclavicular or axillary adenopathy is evident.   ABDOMEN:   LIVER: The liver is enlarged and somewhat hypoattenuating consistent with steatosis/steatohepatitis.   BILE DUCTS: No dilation   GALLBLADDER: The gallbladder is hydropic. Its wall does not appear thickened and there are no definite adjacent inflammatory findings. There is, however, a stone which appears to be lodged in the cystic duct.   PANCREAS: Within normal limits.   SPLEEN: Within normal limits.   ADRENAL GLANDS: Bilateral adrenal glands appear normal.   KIDNEYS AND URETERS: The kidneys are normal in size and enhance symmetrically. There are multiple bilateral nonobstructing renal stones. These are unchanged from the prior CT. The right hydroureteral nephrosis and ureter stone apparent on the prior exam have resolved. There is no hydroureteral nephrosis or ureter stone on today's exam.   PELVIS:   BLADDER: The bladder appears normal   REPRODUCTIVE ORGANS: Unremarkable   BOWEL: The stomach is unremarkable.  The small and large bowel are normal in caliber and demonstrate no wall thickening. There are multiple colonic diverticula but there is no convincing evidence for acute diverticulitis. The appendix appears normal.     VESSELS: The aorta and IVC appear normal.   PERITONEUM/RETROPERITONEUM/LYMPH NODES: No ascites or free air, no fluid collection.  No abdominopelvic lymphadenopathy is present.   BONE AND SOFT TISSUE: No suspicious osseous lesions are identified.  The soft tissues of the abdominal wall appear intact.        CHEST: 1.  No acute thoracic findings.   ABDOMEN-PELVIS: 1.  Hydropic gallbladder with densely calcified stone lodged in the cystic duct. 2. Hepatic steatosis/steatohepatitis. 3. Nonobstructing nephrolithiasis bilaterally similar to prior. The right hydroureteral nephrosis and ureter stone apparent on the prior exam have resolved.     MACRO: None   Signed by: Joseph Schoenberger 8/11/2024 1:24 PM Dictation workstation:   [id number]    US gallbladder    Result Date: 8/11/2024  STUDY: Right Upper Quadrant Ultrasound; 8/11/2024 11:20 am. INDICATION: Epigastric pain with eating. RUQ pain. Nausea. COMPARISON: CT A/P 11/14/2021. ACCESSION NUMBER(S): [id number] ORDERING CLINICIAN: DONATO COTTON TECHNIQUE: Ultrasound of the Right Upper Quadrant. FINDINGS: LIVER: The liver demonstrates increased echogenicity.   GALLBLADDER: The gallbladder contains sludge.  There are no stones. Negative Chávez's sign. There is no pericholecystic fluid or wall thickening.   BILE DUCTS: The common bile duct measures 0.7 cm.  There is no intrahepatic biliary dilatation.   PANCREAS: The pancreas is not well seen due to overlying bowel gas.  RIGHT KIDNEY: The right kidney measures 11.2 cm in length.  Renal cortical echotexture is normal.  There is no hydronephrosis.  There are no stones.  There are no cysts.    Diffuse hepatic steatosis. Gallbladder sludge. No cholelithiasis, gallbladder wall thickening, or biliary dilatation is appreciated. Signed by Mata Tovar MD            Assessment/Plan   Principal Problem:    Calculus of gallbladder with chronic cholecystitis without obstruction    Mr. Lock has a history and imaging studies consistent diagnosis of intermittent biliary colic.  He has chronic cholecystitis and cholelithiasis.  He has an impacted stone at the neck of the gallbladder.  He has no evidence of acute cholecystitis based on history examination and imaging studies.  With morphine, the patient's symptoms have basically  resolved in the emergency department he is nontender on examination at the moment.    Recommendation:    In order to prevent further symptoms and complications of gallbladder disease, laparoscopic cholecystectomy is indicated and recommended.    The patient be taken directly from the emergency room to the operating room and undergo laparoscopic, possible open, cholecystectomy.  He will have an extended recovery thereafter, and likely be discharged tomorrow to home.    Gallbladder Consent: The procedure was explained to the patient in detail, including the risks, benefits and alternatives. The risks include, but are not limited to, infection, bleeding, hematoma, seroma, cystic duct leak, bile leak from accessory duct, aberrant duct, or liver bed; common bile duct injury, or injury to other portions of the biliary tree; retained common bile duct stones, biloma, cholangitis, pancreatitis, need for further surgery, need for common bile duct exploration,  injury to other intraabdominal organs or neurovascular structures, or conversion to an open cholecystectomy.  The patient agreed with the plan and signed the electronic consent.             Lee Regan MD

## 2024-08-11 NOTE — PROGRESS NOTES
Pharmacy Medication History Review    Yannick Lock is a 59 y.o. male admitted for Calculus of gallbladder with chronic cholecystitis without obstruction. Pharmacy reviewed the patient's [id number] medications and allergies for accuracy.    The list below reflectives the updated PTA list. Please review each medication in order reconciliation for additional clarification and justification.  Prior to Admission medications    Medication Sig Start Date End Date Authorizing Provider   aspirin 81 mg EC tablet Take 2 tablets (162 mg) by mouth once daily.   Historical Provider, MD   diclofenac sodium (Voltaren) 1 % gel Apply 4.5 inches (4 g) topically 4 times a day as needed.   Historical Provider, MD   multivitamin with minerals tablet Take 1 tablet by mouth once daily.   Historical Provider, MD   psyllium (Metamucil) 3.4 gram packet Take 1 packet by mouth once daily.   Historical Provider, MD        The list below reflectives the updated allergy list. Please review each documented allergy for additional clarification and justification.  Allergies  Reviewed by Melissa Vigil on 8/11/2024        Severity Reactions Comments    Lactose Medium Diarrhea, GI Upset             Below are additional concerns with the patient's PTA list.      Melissa Vigil

## 2024-08-12 LAB
ALBUMIN SERPL BCP-MCNC: 3.8 G/DL (ref 3.4–5)
ALP SERPL-CCNC: 43 U/L (ref 33–120)
ALT SERPL W P-5'-P-CCNC: 62 U/L (ref 10–52)
ANION GAP SERPL CALC-SCNC: 14 MMOL/L (ref 10–20)
AST SERPL W P-5'-P-CCNC: 87 U/L (ref 9–39)
BASOPHILS # BLD AUTO: 0.01 X10*3/UL (ref 0–0.1)
BASOPHILS NFR BLD AUTO: 0.1 %
BILIRUB SERPL-MCNC: 0.8 MG/DL (ref 0–1.2)
BUN SERPL-MCNC: 15 MG/DL (ref 6–23)
CALCIUM SERPL-MCNC: 8.9 MG/DL (ref 8.6–10.3)
CHLORIDE SERPL-SCNC: 98 MMOL/L (ref 98–107)
CO2 SERPL-SCNC: 29 MMOL/L (ref 21–32)
CREAT SERPL-MCNC: 1.1 MG/DL (ref 0.5–1.3)
EGFRCR SERPLBLD CKD-EPI 2021: 78 ML/MIN/1.73M*2
EOSINOPHIL # BLD AUTO: 0 X10*3/UL (ref 0–0.7)
EOSINOPHIL NFR BLD AUTO: 0 %
ERYTHROCYTE [DISTWIDTH] IN BLOOD BY AUTOMATED COUNT: 13.5 % (ref 11.5–14.5)
GLUCOSE SERPL-MCNC: 159 MG/DL (ref 74–99)
HCT VFR BLD AUTO: 41.8 % (ref 41–52)
HGB BLD-MCNC: 13.9 G/DL (ref 13.5–17.5)
HOLD SPECIMEN: NORMAL
IMM GRANULOCYTES # BLD AUTO: 0.04 X10*3/UL (ref 0–0.7)
IMM GRANULOCYTES NFR BLD AUTO: 0.4 % (ref 0–0.9)
LYMPHOCYTES # BLD AUTO: 0.78 X10*3/UL (ref 1.2–4.8)
LYMPHOCYTES NFR BLD AUTO: 7.8 %
MCH RBC QN AUTO: 30.8 PG (ref 26–34)
MCHC RBC AUTO-ENTMCNC: 33.3 G/DL (ref 32–36)
MCV RBC AUTO: 93 FL (ref 80–100)
MONOCYTES # BLD AUTO: 1.51 X10*3/UL (ref 0.1–1)
MONOCYTES NFR BLD AUTO: 15.2 %
NEUTROPHILS # BLD AUTO: 7.62 X10*3/UL (ref 1.2–7.7)
NEUTROPHILS NFR BLD AUTO: 76.5 %
NRBC BLD-RTO: 0 /100 WBCS (ref 0–0)
PLATELET # BLD AUTO: 222 X10*3/UL (ref 150–450)
POTASSIUM SERPL-SCNC: 4.5 MMOL/L (ref 3.5–5.3)
PROT SERPL-MCNC: 7 G/DL (ref 6.4–8.2)
RBC # BLD AUTO: 4.51 X10*6/UL (ref 4.5–5.9)
SODIUM SERPL-SCNC: 136 MMOL/L (ref 136–145)
WBC # BLD AUTO: 10 X10*3/UL (ref 4.4–11.3)

## 2024-08-12 PROCEDURE — 99024 POSTOP FOLLOW-UP VISIT: CPT | Performed by: SURGERY

## 2024-08-12 PROCEDURE — 2500000004 HC RX 250 GENERAL PHARMACY W/ HCPCS (ALT 636 FOR OP/ED): Mod: JZ

## 2024-08-12 PROCEDURE — 2500000001 HC RX 250 WO HCPCS SELF ADMINISTERED DRUGS (ALT 637 FOR MEDICARE OP): Performed by: SURGERY

## 2024-08-12 PROCEDURE — G0378 HOSPITAL OBSERVATION PER HR: HCPCS

## 2024-08-12 PROCEDURE — 96375 TX/PRO/DX INJ NEW DRUG ADDON: CPT

## 2024-08-12 PROCEDURE — 96361 HYDRATE IV INFUSION ADD-ON: CPT

## 2024-08-12 PROCEDURE — 96365 THER/PROPH/DIAG IV INF INIT: CPT

## 2024-08-12 PROCEDURE — 36415 COLL VENOUS BLD VENIPUNCTURE: CPT | Performed by: SURGERY

## 2024-08-12 PROCEDURE — 80053 COMPREHEN METABOLIC PANEL: CPT | Performed by: SURGERY

## 2024-08-12 PROCEDURE — 99232 SBSQ HOSP IP/OBS MODERATE 35: CPT

## 2024-08-12 PROCEDURE — 96372 THER/PROPH/DIAG INJ SC/IM: CPT | Performed by: SURGERY

## 2024-08-12 PROCEDURE — 85025 COMPLETE CBC W/AUTO DIFF WBC: CPT | Performed by: SURGERY

## 2024-08-12 PROCEDURE — 2500000004 HC RX 250 GENERAL PHARMACY W/ HCPCS (ALT 636 FOR OP/ED): Performed by: SURGERY

## 2024-08-12 PROCEDURE — 96376 TX/PRO/DX INJ SAME DRUG ADON: CPT

## 2024-08-12 RX ADMIN — CEFAZOLIN SODIUM 2 G: 2 INJECTION, SOLUTION INTRAVENOUS at 02:32

## 2024-08-12 RX ADMIN — IBUPROFEN 600 MG: 600 TABLET, FILM COATED ORAL at 17:46

## 2024-08-12 RX ADMIN — HYDROMORPHONE HYDROCHLORIDE 0.5 MG: 1 INJECTION, SOLUTION INTRAMUSCULAR; INTRAVENOUS; SUBCUTANEOUS at 07:44

## 2024-08-12 RX ADMIN — HYDROMORPHONE HYDROCHLORIDE 1 MG: 1 INJECTION, SOLUTION INTRAMUSCULAR; INTRAVENOUS; SUBCUTANEOUS at 03:34

## 2024-08-12 RX ADMIN — DOCUSATE SODIUM 100 MG: 100 CAPSULE, LIQUID FILLED ORAL at 08:20

## 2024-08-12 RX ADMIN — ACETAMINOPHEN 650 MG: 325 TABLET ORAL at 03:34

## 2024-08-12 RX ADMIN — CEFAZOLIN SODIUM 2 G: 2 INJECTION, SOLUTION INTRAVENOUS at 17:46

## 2024-08-12 RX ADMIN — HYDROMORPHONE HYDROCHLORIDE 1 MG: 1 INJECTION, SOLUTION INTRAMUSCULAR; INTRAVENOUS; SUBCUTANEOUS at 00:25

## 2024-08-12 RX ADMIN — HEPARIN SODIUM 5000 UNITS: 5000 INJECTION INTRAVENOUS; SUBCUTANEOUS at 22:25

## 2024-08-12 RX ADMIN — HEPARIN SODIUM 5000 UNITS: 5000 INJECTION INTRAVENOUS; SUBCUTANEOUS at 14:22

## 2024-08-12 RX ADMIN — IBUPROFEN 600 MG: 600 TABLET, FILM COATED ORAL at 06:09

## 2024-08-12 RX ADMIN — HEPARIN SODIUM 5000 UNITS: 5000 INJECTION INTRAVENOUS; SUBCUTANEOUS at 06:09

## 2024-08-12 RX ADMIN — FAMOTIDINE 20 MG: 20 TABLET ORAL at 20:57

## 2024-08-12 RX ADMIN — CEFAZOLIN SODIUM 2 G: 2 INJECTION, SOLUTION INTRAVENOUS at 10:39

## 2024-08-12 RX ADMIN — ACETAMINOPHEN 650 MG: 325 TABLET ORAL at 22:25

## 2024-08-12 RX ADMIN — ACETAMINOPHEN 650 MG: 325 TABLET ORAL at 15:38

## 2024-08-12 RX ADMIN — ACETAMINOPHEN 650 MG: 325 TABLET ORAL at 10:39

## 2024-08-12 RX ADMIN — SODIUM CHLORIDE, POTASSIUM CHLORIDE, SODIUM LACTATE AND CALCIUM CHLORIDE 100 ML/HR: 600; 310; 30; 20 INJECTION, SOLUTION INTRAVENOUS at 07:49

## 2024-08-12 RX ADMIN — DOCUSATE SODIUM 100 MG: 100 CAPSULE, LIQUID FILLED ORAL at 20:57

## 2024-08-12 RX ADMIN — IBUPROFEN 600 MG: 600 TABLET, FILM COATED ORAL at 12:04

## 2024-08-12 RX ADMIN — FAMOTIDINE 20 MG: 20 TABLET ORAL at 08:20

## 2024-08-12 ASSESSMENT — PAIN DESCRIPTION - LOCATION: LOCATION: ABDOMEN

## 2024-08-12 ASSESSMENT — PAIN - FUNCTIONAL ASSESSMENT
PAIN_FUNCTIONAL_ASSESSMENT: 0-10

## 2024-08-12 ASSESSMENT — COGNITIVE AND FUNCTIONAL STATUS - GENERAL
MOBILITY SCORE: 18
STANDING UP FROM CHAIR USING ARMS: A LITTLE
DRESSING REGULAR LOWER BODY CLOTHING: A LITTLE
HELP NEEDED FOR BATHING: A LITTLE
MOVING TO AND FROM BED TO CHAIR: A LITTLE
STANDING UP FROM CHAIR USING ARMS: A LITTLE
DAILY ACTIVITIY SCORE: 21
MOVING FROM LYING ON BACK TO SITTING ON SIDE OF FLAT BED WITH BEDRAILS: A LITTLE
DAILY ACTIVITIY SCORE: 21
TURNING FROM BACK TO SIDE WHILE IN FLAT BAD: A LITTLE
DRESSING REGULAR UPPER BODY CLOTHING: A LITTLE
DRESSING REGULAR UPPER BODY CLOTHING: A LITTLE
MOVING FROM LYING ON BACK TO SITTING ON SIDE OF FLAT BED WITH BEDRAILS: A LITTLE
DRESSING REGULAR LOWER BODY CLOTHING: A LITTLE
CLIMB 3 TO 5 STEPS WITH RAILING: A LITTLE
MOBILITY SCORE: 18
TURNING FROM BACK TO SIDE WHILE IN FLAT BAD: A LITTLE
WALKING IN HOSPITAL ROOM: A LITTLE
MOVING TO AND FROM BED TO CHAIR: A LITTLE
HELP NEEDED FOR BATHING: A LITTLE
WALKING IN HOSPITAL ROOM: A LITTLE
CLIMB 3 TO 5 STEPS WITH RAILING: A LITTLE

## 2024-08-12 ASSESSMENT — PAIN SCALES - GENERAL
PAINLEVEL_OUTOF10: 3
PAINLEVEL_OUTOF10: 7
PAINLEVEL_OUTOF10: 5 - MODERATE PAIN
PAINLEVEL_OUTOF10: 5 - MODERATE PAIN
PAINLEVEL_OUTOF10: 0 - NO PAIN
PAINLEVEL_OUTOF10: 7
PAINLEVEL_OUTOF10: 5 - MODERATE PAIN

## 2024-08-12 ASSESSMENT — PAIN DESCRIPTION - DESCRIPTORS
DESCRIPTORS: SHARP
DESCRIPTORS: BURNING
DESCRIPTORS: BURNING

## 2024-08-12 ASSESSMENT — PAIN DESCRIPTION - ORIENTATION: ORIENTATION: RIGHT

## 2024-08-12 ASSESSMENT — ACTIVITIES OF DAILY LIVING (ADL): LACK_OF_TRANSPORTATION: NO

## 2024-08-12 NOTE — ANESTHESIA POSTPROCEDURE EVALUATION
Patient: Yannick Lock    Procedure Summary       Date: 08/11/24 Room / Location: [facility] OR 08 / [facility]    Anesthesia Start: 1815 Anesthesia Stop: 2005    Procedure: Cholecystectomy Laparoscopy with Cholangiogram (Abdomen) Diagnosis:       Calculus of gallbladder with chronic cholecystitis without obstruction      (Calculus of gallbladder with chronic cholecystitis without obstruction [K80.10])    Surgeons: Lee Regan MD Responsible Provider: Jose Luis Chi MD    Anesthesia Type: general ASA Status: 2            Anesthesia Type: general    Vitals Value Taken Time   /84 08/11/24 2004   Temp 36.8 °C (98.2 °F) 08/11/24 2004   Pulse 101 08/11/24 2004   Resp 18 08/11/24 2004   SpO2 97 % 08/11/24 2004       Anesthesia Post Evaluation    Patient location during evaluation: PACU  Patient participation: complete - patient participated  Level of consciousness: awake  Pain score: 0  Pain management: adequate  Airway patency: patent  Cardiovascular status: acceptable  Respiratory status: acceptable  Hydration status: acceptable  Postoperative Nausea and Vomiting: none        There were no known notable events for this encounter.

## 2024-08-12 NOTE — CARE PLAN
The patient's goals for the shift include      The clinical goals for the shift include Comfort and safety

## 2024-08-12 NOTE — CARE PLAN
The patient's goals for the shift include      The clinical goals for the shift include comfort and safety/ HDS      Problem: Pain - Adult  Goal: Verbalizes/displays adequate comfort level or baseline comfort level  Outcome: Progressing     Problem: Safety - Adult  Goal: Free from fall injury  Outcome: Progressing     Problem: Pain  Goal: Takes deep breaths with improved pain control throughout the shift  Outcome: Progressing     Problem: Pain  Goal: Turns in bed with improved pain control throughout the shift  Outcome: Progressing     Problem: Skin  Goal: Prevent/manage excess moisture  Outcome: Progressing     Problem: Skin  Goal: Prevent/minimize sheer/friction injuries  Outcome: Progressing

## 2024-08-12 NOTE — HOSPITAL COURSE
"Yannick Lock is a 59 y.o. male with history of B/L inguinal hernia repair and kidney stones s/p lithotripsy who presented to [facility] ED 8/11 for abdominal pain. Upon further imaging in ED, patient found to have distended gallbladder with calcified stone in neck of gallbladder. Patient was taken from ED to OR for laparoscopic cholecystectomy.     Dr. Regan performed a laparoscopic cholecystectomy during which he visualized a hydropic gallbladder that was distended and filled with \"white bile\" indicating complete cystic duct obstruction. The gallbladder was acutely inflamed, thickened and hemorrhagic. A ROYER drain was placed along the gallbladder bed. EBL was 50 mL. Patient admitted to general surgery service and was transferred to nursing floor hemodynamically stable.     Patient tolerating FLD on POD1. Had minimal oily/bloody output from ROYER drain that improved by POD2.   "

## 2024-08-12 NOTE — PROGRESS NOTES
"Yannick Lock is a 59 y.o. male on day 0 of admission presenting with Calculus of gallbladder with chronic cholecystitis without obstruction.    Subjective   Patient laying in bed this morning. Admits to abdominal pain that has improved since admission. Has noticed increased pain with inspiration. Has passed gas but denies BM yet. Tolerating CLD without increased pain. Denies nausea, vomiting, chest pain, SOB.        Objective     Physical Exam  Vitals reviewed.   Constitutional:       General: He is not in acute distress.     Appearance: Normal appearance. He is obese.   Eyes:      Extraocular Movements: Extraocular movements intact.   Cardiovascular:      Rate and Rhythm: Normal rate and regular rhythm.   Pulmonary:      Effort: Pulmonary effort is normal.      Breath sounds: Normal breath sounds and air entry.   Abdominal:      General: Bowel sounds are decreased.      Palpations: Abdomen is soft.      Tenderness: There is abdominal tenderness.      Comments: Surgical incisions covered in clean/dry bandages. ROYER drain in place to self-suction; there is thin oily/bloody output.    Musculoskeletal:      Right lower leg: No edema.      Left lower leg: No edema.   Skin:     General: Skin is warm and dry.   Neurological:      Mental Status: He is alert.   Psychiatric:         Behavior: Behavior is cooperative.         Last Recorded Vitals  Blood pressure (!) 142/94, pulse 86, temperature 36.2 °C (97.2 °F), temperature source Temporal, resp. rate 16, height 1.702 m (5' 7\"), weight 109 kg (240 lb), SpO2 92%.  Intake/Output last 3 Shifts:  I/O last 3 completed shifts:  In: 3735 (34.3 mL/kg) [P.O.:1440; I.V.:1195 (11 mL/kg); IV Piggyback:1100]  Out: 700 (6.4 mL/kg) [Urine:335 (0.1 mL/kg/hr); Drains:315; Blood:50]  Weight: 108.9 kg     Relevant Results  Results for orders placed or performed during the hospital encounter of 08/11/24 (from the past 24 hour(s))   Urinalysis with Reflex Culture and Microscopic   Result Value Ref " Range    Color, Urine Yellow Straw, Yellow    Appearance, Urine Clear Clear    Specific Gravity, Urine 1.020 1.005 - 1.035    pH, Urine 6.0 5.0, 5.5, 6.0, 6.5, 7.0, 7.5, 8.0    Protein, Urine 10 (TRACE) NEGATIVE, 10 (TRACE) mg/dL    Glucose, Urine NEGATIVE NEGATIVE mg/dL    Blood, Urine 0.03 (TRACE) (A) NEGATIVE    Ketones, Urine 10 (1+) (A) NEGATIVE mg/dL    Bilirubin, Urine NEGATIVE NEGATIVE    Urobilinogen, Urine NORM NORM mg/dL    Nitrite, Urine NEGATIVE NEGATIVE    Leukocyte Esterase, Urine NEGATIVE NEGATIVE   Extra Urine Gray Tube   Result Value Ref Range    Extra Tube Hold for add-ons.    Urinalysis Microscopic   Result Value Ref Range    WBC, Urine 1-5 1-5, NONE /HPF    RBC, Urine 1-2 NONE, 1-2, 3-5 /HPF    Squamous Epithelial Cells, Urine 1-9 (SPARSE) Reference range not established. /HPF    Budding Yeast, Urine PRESENT (A) NONE /HPF   CBC and Auto Differential   Result Value Ref Range    WBC 10.0 4.4 - 11.3 x10*3/uL    nRBC 0.0 0.0 - 0.0 /100 WBCs    RBC 4.51 4.50 - 5.90 x10*6/uL    Hemoglobin 13.9 13.5 - 17.5 g/dL    Hematocrit 41.8 41.0 - 52.0 %    MCV 93 80 - 100 fL    MCH 30.8 26.0 - 34.0 pg    MCHC 33.3 32.0 - 36.0 g/dL    RDW 13.5 11.5 - 14.5 %    Platelets 222 150 - 450 x10*3/uL    Neutrophils % 76.5 40.0 - 80.0 %    Immature Granulocytes %, Automated 0.4 0.0 - 0.9 %    Lymphocytes % 7.8 13.0 - 44.0 %    Monocytes % 15.2 2.0 - 10.0 %    Eosinophils % 0.0 0.0 - 6.0 %    Basophils % 0.1 0.0 - 2.0 %    Neutrophils Absolute 7.62 1.20 - 7.70 x10*3/uL    Immature Granulocytes Absolute, Automated 0.04 0.00 - 0.70 x10*3/uL    Lymphocytes Absolute 0.78 (L) 1.20 - 4.80 x10*3/uL    Monocytes Absolute 1.51 (H) 0.10 - 1.00 x10*3/uL    Eosinophils Absolute 0.00 0.00 - 0.70 x10*3/uL    Basophils Absolute 0.01 0.00 - 0.10 x10*3/uL   Comprehensive metabolic panel   Result Value Ref Range    Glucose 159 (H) 74 - 99 mg/dL    Sodium 136 136 - 145 mmol/L    Potassium 4.5 3.5 - 5.3 mmol/L    Chloride 98 98 - 107 mmol/L     Bicarbonate 29 21 - 32 mmol/L    Anion Gap 14 10 - 20 mmol/L    Urea Nitrogen 15 6 - 23 mg/dL    Creatinine 1.10 0.50 - 1.30 mg/dL    eGFR 78 >60 mL/min/1.73m*2    Calcium 8.9 8.6 - 10.3 mg/dL    Albumin 3.8 3.4 - 5.0 g/dL    Alkaline Phosphatase 43 33 - 120 U/L    Total Protein 7.0 6.4 - 8.2 g/dL    AST 87 (H) 9 - 39 U/L    Bilirubin, Total 0.8 0.0 - 1.2 mg/dL    ALT 62 (H) 10 - 52 U/L     CT chest abdomen pelvis w IV contrast    Result Date: 8/11/2024  Interpreted By:  Schoenberger, Joseph, STUDY: CT CHEST ABDOMEN PELVIS W IV CONTRAST;  8/11/2024 1:10 pm   INDICATION: Signs/Symptoms:epigastric abdominal pain.   COMPARISON: Abdomen and pelvis CT dated 11/14/2021   ACCESSION NUMBER(S): [id number]   ORDERING CLINICIAN: DONATO COTTON   TECHNIQUE: CT of the chest, abdomen, and pelvis was performed.  Contiguous axial images were obtained at 3 mm slice thickness through the chest, abdomen and pelvis. Coronal and sagittal reconstructions at 3 mm slice thickness were performed. 75 ml of contrast Omnipaque 350 were administered intravenously without immediate complication.   FINDINGS: CHEST:   LUNG/PLEURA/LARGE AIRWAYS: There is no pleural effusion, pneumothorax, or airspace opacity. The large airways are intact. There is some minimal dependent atelectasis in the right lateral posterior costophrenic angle.   VESSELS: Aorta and pulmonary arteries are normal caliber. Mild atherosclerotic changes are noted of the aorta and branching vessels.  No coronary artery calcifications are present.   HEART: Normal size.  No pericardial effusion   MEDIASTINUM AND MARLINE: No mediastinal, hilar or axillary lymphadenopathy is present.  The esophagus is normal.   CHEST WALL AND LOWER NECK: The soft tissues of the chest wall demonstrate no gross abnormality. The thyroid appears normal. No supraclavicular or axillary adenopathy is evident.   ABDOMEN:   LIVER: The liver is enlarged and somewhat hypoattenuating consistent with  steatosis/steatohepatitis.   BILE DUCTS: No dilation   GALLBLADDER: The gallbladder is hydropic. Its wall does not appear thickened and there are no definite adjacent inflammatory findings. There is, however, a stone which appears to be lodged in the cystic duct.   PANCREAS: Within normal limits.   SPLEEN: Within normal limits.   ADRENAL GLANDS: Bilateral adrenal glands appear normal.   KIDNEYS AND URETERS: The kidneys are normal in size and enhance symmetrically. There are multiple bilateral nonobstructing renal stones. These are unchanged from the prior CT. The right hydroureteral nephrosis and ureter stone apparent on the prior exam have resolved. There is no hydroureteral nephrosis or ureter stone on today's exam.   PELVIS:   BLADDER: The bladder appears normal   REPRODUCTIVE ORGANS: Unremarkable   BOWEL: The stomach is unremarkable.  The small and large bowel are normal in caliber and demonstrate no wall thickening. There are multiple colonic diverticula but there is no convincing evidence for acute diverticulitis. The appendix appears normal.     VESSELS: The aorta and IVC appear normal.   PERITONEUM/RETROPERITONEUM/LYMPH NODES: No ascites or free air, no fluid collection.  No abdominopelvic lymphadenopathy is present.   BONE AND SOFT TISSUE: No suspicious osseous lesions are identified.  The soft tissues of the abdominal wall appear intact.       CHEST: 1.  No acute thoracic findings.   ABDOMEN-PELVIS: 1.  Hydropic gallbladder with densely calcified stone lodged in the cystic duct. 2. Hepatic steatosis/steatohepatitis. 3. Nonobstructing nephrolithiasis bilaterally similar to prior. The right hydroureteral nephrosis and ureter stone apparent on the prior exam have resolved.     MACRO: None   Signed by: Joseph Schoenberger 8/11/2024 1:24 PM Dictation workstation:   [id number]     gallbladder    Result Date: 8/11/2024  STUDY: Right Upper Quadrant Ultrasound; 8/11/2024 11:20 am. INDICATION: Epigastric pain  with eating. RUQ pain. Nausea. COMPARISON: CT A/P 11/14/2021. ACCESSION NUMBER(S): [id number] ORDERING CLINICIAN: DONATO COTTON TECHNIQUE: Ultrasound of the Right Upper Quadrant. FINDINGS: LIVER: The liver demonstrates increased echogenicity.   GALLBLADDER: The gallbladder contains sludge.  There are no stones. Negative Chávez's sign. There is no pericholecystic fluid or wall thickening.   BILE DUCTS: The common bile duct measures 0.7 cm.  There is no intrahepatic biliary dilatation.   PANCREAS: The pancreas is not well seen due to overlying bowel gas.  RIGHT KIDNEY: The right kidney measures 11.2 cm in length.  Renal cortical echotexture is normal.  There is no hydronephrosis.  There are no stones.  There are no cysts.    Diffuse hepatic steatosis. Gallbladder sludge. No cholelithiasis, gallbladder wall thickening, or biliary dilatation is appreciated. Signed by Mata Tovar MD            Assessment/Plan   Principal Problem:    Calculus of gallbladder with chronic cholecystitis without obstruction  Active Problems:    Acute calculous cholecystitis    Yannick Lock is a 59 y.o. male with history of B/L inguinal hernia repair and kidney stones s/p lithotripsy who was admitted to general surgery for cholecystitis.     Procedures  8/11: laparoscopic cholecystectomy    #Acute cholecystitis with hydrops   #Transaminitis, mild; likely reactive from surgery   - ROYER drain in place to self suction  - Continue Ancef  - Trend CMP  - Full liquid diet, will stop IV fluids once adequate PO intake     DVT prophylaxis: subQ heparin, SCD's, ambulation as tolerated  Dispo: not medically ready yet. Anticipate patient return home once stable. Appreciate TCC involvement.      Patient seen and discussed with attending surgeon, Dr. Regan.      Estela Vitale PA-C

## 2024-08-13 VITALS
SYSTOLIC BLOOD PRESSURE: 137 MMHG | HEART RATE: 81 BPM | DIASTOLIC BLOOD PRESSURE: 79 MMHG | TEMPERATURE: 96.8 F | WEIGHT: 240 LBS | OXYGEN SATURATION: 94 % | RESPIRATION RATE: 18 BRPM | BODY MASS INDEX: 37.67 KG/M2 | HEIGHT: 67 IN

## 2024-08-13 PROBLEM — K80.00 ACUTE CALCULOUS CHOLECYSTITIS: Status: RESOLVED | Noted: 2024-08-11 | Resolved: 2024-08-13

## 2024-08-13 PROBLEM — K80.10 CALCULUS OF GALLBLADDER WITH CHRONIC CHOLECYSTITIS WITHOUT OBSTRUCTION: Status: RESOLVED | Noted: 2024-08-11 | Resolved: 2024-08-13

## 2024-08-13 LAB
ALBUMIN SERPL BCP-MCNC: 3.6 G/DL (ref 3.4–5)
ALP SERPL-CCNC: 46 U/L (ref 33–120)
ALT SERPL W P-5'-P-CCNC: 39 U/L (ref 10–52)
ANION GAP SERPL CALC-SCNC: 13 MMOL/L (ref 10–20)
AST SERPL W P-5'-P-CCNC: 42 U/L (ref 9–39)
BILIRUB SERPL-MCNC: 0.7 MG/DL (ref 0–1.2)
BUN SERPL-MCNC: 12 MG/DL (ref 6–23)
CALCIUM SERPL-MCNC: 9.1 MG/DL (ref 8.6–10.3)
CHLORIDE SERPL-SCNC: 102 MMOL/L (ref 98–107)
CO2 SERPL-SCNC: 27 MMOL/L (ref 21–32)
CREAT SERPL-MCNC: 0.95 MG/DL (ref 0.5–1.3)
EGFRCR SERPLBLD CKD-EPI 2021: >90 ML/MIN/1.73M*2
GLUCOSE SERPL-MCNC: 131 MG/DL (ref 74–99)
HOLD SPECIMEN: NORMAL
POTASSIUM SERPL-SCNC: 3.6 MMOL/L (ref 3.5–5.3)
PROT SERPL-MCNC: 7.2 G/DL (ref 6.4–8.2)
SODIUM SERPL-SCNC: 138 MMOL/L (ref 136–145)

## 2024-08-13 PROCEDURE — G0378 HOSPITAL OBSERVATION PER HR: HCPCS

## 2024-08-13 PROCEDURE — 96372 THER/PROPH/DIAG INJ SC/IM: CPT | Performed by: SURGERY

## 2024-08-13 PROCEDURE — 36415 COLL VENOUS BLD VENIPUNCTURE: CPT

## 2024-08-13 PROCEDURE — 99239 HOSP IP/OBS DSCHRG MGMT >30: CPT

## 2024-08-13 PROCEDURE — 2500000001 HC RX 250 WO HCPCS SELF ADMINISTERED DRUGS (ALT 637 FOR MEDICARE OP): Performed by: SURGERY

## 2024-08-13 PROCEDURE — 2500000004 HC RX 250 GENERAL PHARMACY W/ HCPCS (ALT 636 FOR OP/ED): Performed by: SURGERY

## 2024-08-13 PROCEDURE — 99024 POSTOP FOLLOW-UP VISIT: CPT | Performed by: SURGERY

## 2024-08-13 PROCEDURE — 84075 ASSAY ALKALINE PHOSPHATASE: CPT

## 2024-08-13 RX ORDER — OXYCODONE HYDROCHLORIDE 5 MG/1
10 TABLET ORAL EVERY 4 HOURS PRN
Status: DISCONTINUED | OUTPATIENT
Start: 2024-08-13 | End: 2024-08-13 | Stop reason: HOSPADM

## 2024-08-13 RX ORDER — OXYCODONE HYDROCHLORIDE 5 MG/1
5 TABLET ORAL EVERY 4 HOURS PRN
Qty: 15 TABLET | Refills: 0 | Status: SHIPPED | OUTPATIENT
Start: 2024-08-13 | End: 2024-08-16

## 2024-08-13 RX ORDER — OXYCODONE HYDROCHLORIDE 5 MG/1
5 TABLET ORAL EVERY 4 HOURS PRN
Status: DISCONTINUED | OUTPATIENT
Start: 2024-08-13 | End: 2024-08-13 | Stop reason: HOSPADM

## 2024-08-13 RX ORDER — IBUPROFEN 600 MG/1
600 TABLET ORAL EVERY 6 HOURS PRN
Start: 2024-08-13

## 2024-08-13 RX ORDER — ACETAMINOPHEN 325 MG/1
1000 TABLET ORAL EVERY 8 HOURS PRN
Start: 2024-08-13

## 2024-08-13 RX ADMIN — IBUPROFEN 600 MG: 600 TABLET, FILM COATED ORAL at 11:34

## 2024-08-13 RX ADMIN — IBUPROFEN 600 MG: 600 TABLET, FILM COATED ORAL at 00:25

## 2024-08-13 RX ADMIN — ACETAMINOPHEN 650 MG: 325 TABLET ORAL at 04:49

## 2024-08-13 RX ADMIN — HEPARIN SODIUM 5000 UNITS: 5000 INJECTION INTRAVENOUS; SUBCUTANEOUS at 06:18

## 2024-08-13 RX ADMIN — HYDROMORPHONE HYDROCHLORIDE 0.5 MG: 1 INJECTION, SOLUTION INTRAMUSCULAR; INTRAVENOUS; SUBCUTANEOUS at 05:01

## 2024-08-13 RX ADMIN — DOCUSATE SODIUM 100 MG: 100 CAPSULE, LIQUID FILLED ORAL at 08:29

## 2024-08-13 RX ADMIN — CEFAZOLIN SODIUM 2 G: 2 INJECTION, SOLUTION INTRAVENOUS at 10:03

## 2024-08-13 RX ADMIN — CEFAZOLIN SODIUM 2 G: 2 INJECTION, SOLUTION INTRAVENOUS at 02:28

## 2024-08-13 RX ADMIN — FAMOTIDINE 20 MG: 20 TABLET ORAL at 08:29

## 2024-08-13 RX ADMIN — IBUPROFEN 600 MG: 600 TABLET, FILM COATED ORAL at 06:18

## 2024-08-13 RX ADMIN — ACETAMINOPHEN 650 MG: 325 TABLET ORAL at 10:03

## 2024-08-13 ASSESSMENT — COGNITIVE AND FUNCTIONAL STATUS - GENERAL
WALKING IN HOSPITAL ROOM: A LITTLE
HELP NEEDED FOR BATHING: A LITTLE
MOVING FROM LYING ON BACK TO SITTING ON SIDE OF FLAT BED WITH BEDRAILS: A LITTLE
TURNING FROM BACK TO SIDE WHILE IN FLAT BAD: A LITTLE
STANDING UP FROM CHAIR USING ARMS: A LITTLE
MOVING TO AND FROM BED TO CHAIR: A LITTLE
CLIMB 3 TO 5 STEPS WITH RAILING: A LITTLE
DAILY ACTIVITIY SCORE: 21
DRESSING REGULAR UPPER BODY CLOTHING: A LITTLE
MOBILITY SCORE: 18
DRESSING REGULAR LOWER BODY CLOTHING: A LITTLE

## 2024-08-13 ASSESSMENT — PAIN SCALES - GENERAL
PAINLEVEL_OUTOF10: 0 - NO PAIN
PAINLEVEL_OUTOF10: 0 - NO PAIN
PAINLEVEL_OUTOF10: 6

## 2024-08-13 NOTE — NURSING NOTE
Patient c/o Right shoulder pain. Patient encouraged to move shoulder around. Warm back provided to patient. Will continue to monitor.

## 2024-08-13 NOTE — CARE PLAN
The patient's goals for the shift include rest.    The clinical goals for the shift include safety and comfort

## 2024-08-13 NOTE — CARE PLAN
The patient's goals for the shift include rest.    The clinical goals for the shift include safety, comfort and pain control.    Pain - Adult  Add All  Verbalizes/displays adequate comfort level or baseline comfort level  Add  Today at 0139 - Progressing by Ricky Abraham, RN  Add  Safety - Adult  Add All  Free from fall injury  Add  Today at 0139 - Progressing by Ricky Abraham RN  Add  Discharge Planning  Add All  Discharge to home or other facility with appropriate resources  Add  Today at 0139 - Progressing by Ricky Abraham RN  Add  Chronic Conditions and Co-morbidities  Add All  Patient's chronic conditions and co-morbidity symptoms are monitored and maintained or improved  Add  Today at 0139 - Progressing by Ricky Abraham, RN  Add  Pain  Add All  Takes deep breaths with improved pain control throughout the shift  Add  Today at 0139 - Progressing by Ricky Abraham, ANDRE  Add  Turns in bed with improved pain control throughout the shift  Add  Today at 0139 - Progressing by Ricky Abraham, RN  Add  Walks with improved pain control throughout the shift  Add  Today at 0139 - Progressing by Ricky Abraham, ANDRE  Add  Performs ADL's with improved pain control throughout shift  Add  Today at 0139 - Progressing by Ricky Abraham, ANDRE  Add  Participates in PT with improved pain control throughout the shift  Add  Today at 0139 - Progressing by Ricky Abraham RN  Add  Free from opioid side effects throughout the shift  Add  Today at 0139 - Progressing by Ricky Abraham, ANDRE  Add  Free from acute confusion related to pain meds throughout the shift  Add  Today at 0139 - Progressing by Ricky Abraham, ANDRE  Add  Skin  Add All  Decreased wound size/increased tissue granulation at next dressing change  Add  Today at 0139 - Progressing by Ricky Abraham, RN  Add  Flowsheets  Taken today at 0139  Decreased wound size/increased tissue granulation at next dressing change  Promote sleep for wound  healing  Participates in plan/prevention/treatment measures  Add  Today at 0139 - Progressing by Ricky Abraham RN  Add  Flowsheets  Taken today at 0139  Participates in plan/prevention/treatment measures  Increase activity/out of bed for meals  Prevent/manage excess moisture  Add  Today at 0139 - Progressing by Ricky Abraham RN  Add  Flowsheets  Taken today at 0139  Prevent/manage excess moisture  Monitor for/manage infection if present  Prevent/minimize sheer/friction injuries  Add  Today at 0139 - Progressing by Ricky Abraham RN  Add  Flowsheets  Taken today at 0139  Prevent/minimize sheer/friction injuries  Increase activity/out of bed for meals  Promote/optimize nutrition  Add  Today at 0139 - Progressing by Ricky Abraham RN  Add  Flowsheets  Taken today at 0139  Promote/optimize nutrition  Offer water/supplements/favorite foods  Promote skin healing  Add  Today at 0139 - Progressing by Ricky Abraham RN  Add  Flowsheets  Taken today at 0139  Promote skin healing  Assess skin/pad under line(s)/device(s)  Fall/Injury  Add All  Not fall by end of shift  Add  Today at 0139 - Progressing by Ricky Abraham RN  Add  Be free from injury by end of the shift  Add  Today at 0139 - Progressing by Ricky Abraham RN  Add  Verbalize understanding of personal risk factors for fall in the hospital  Add  Today at 0139 - Progressing by Ricky Abraham RN  Add  Verbalize understanding of risk factor reduction measures to prevent injury from fall in the home  Add  Today at 0139 - Progressing by Ricky Abraham RN  Add  Use assistive devices by end of the shift  Add  Today at 0139 - Progressing by Ricky Abraham RN  Add  Pace activities to prevent fatigue by end of the shift  Add  Today at 0139 - Progressing by Ricky LANE

## 2024-08-13 NOTE — DISCHARGE SUMMARY
"Discharge Diagnosis  Calculus of gallbladder with chronic cholecystitis without obstruction    Issues Requiring Follow-Up  Lap imelda    Test Results Pending At Discharge  Pending Labs       Order Current Status    Surgical Pathology Exam In process    AFB Culture/Smear Preliminary result            Hospital Course  Yannick Lock is a 59 y.o. male with history of B/L inguinal hernia repair and kidney stones s/p lithotripsy who presented to [place] ED 8/11 for abdominal pain. Upon further imaging in ED, patient found to have distended gallbladder with calcified stone in neck of gallbladder. Patient was taken from ED to OR for laparoscopic cholecystectomy.     Dr. Regan performed a laparoscopic cholecystectomy during which he visualized a hydropic gallbladder that was distended and filled with \"white bile\" indicating complete cystic duct obstruction. The gallbladder was acutely inflamed, thickened and hemorrhagic. A ROYER drain was placed along the gallbladder bed. EBL was 50 mL. Patient admitted to general surgery service and was transferred to nursing floor hemodynamically stable.     Patient tolerating FLD on POD1. Had minimal oily/bloody output from ROYER drain that improved by POD2 and drain was removed. Patient tolerating regular diet without nausea or vomiting prior to discharge. Has passed gas. Patient has appointment with Dr. Regan scheduled for 8/26 at 0945 for follow up.     Discharge planning took longer than 30 minutes.     Pertinent Physical Exam At Time of Discharge  Physical Exam  Vitals reviewed.   Constitutional:       General: He is not in acute distress.     Appearance: Normal appearance. He is not ill-appearing.   Eyes:      Extraocular Movements: Extraocular movements intact.   Cardiovascular:      Rate and Rhythm: Normal rate and regular rhythm.   Pulmonary:      Effort: Pulmonary effort is normal.      Breath sounds: Normal breath sounds and air entry.   Abdominal:      General: Bowel sounds are normal.     "  Palpations: Abdomen is soft.      Tenderness: There is no abdominal tenderness.      Comments: Surgical incisions well approximated with carlton in place. ROYER drain site covered with clean, dry bandage. No surrounding erythema, edema, or purulent drainage noted.    Musculoskeletal:      Right lower leg: No edema.      Left lower leg: No edema.   Skin:     General: Skin is warm and dry.   Neurological:      Mental Status: He is alert.   Psychiatric:         Behavior: Behavior is cooperative.         Home Medications     Medication List      ASK your doctor about these medications     aspirin 81 mg EC tablet   diclofenac sodium 1 % gel; Commonly known as: Voltaren   multivitamin with minerals tablet   psyllium 3.4 gram packet; Commonly known as: Metamucil       Outpatient Follow-Up  No future appointments.    Estela Vitale PA-C

## 2024-08-14 LAB
ACID FAST STN SPEC: NORMAL
MYCOBACTERIUM SPEC CULT: NORMAL

## 2024-08-19 LAB
LABORATORY COMMENT REPORT: NORMAL
PATH REPORT.FINAL DX SPEC: NORMAL
PATH REPORT.GROSS SPEC: NORMAL
PATH REPORT.RELEVANT HX SPEC: NORMAL
PATH REPORT.TOTAL CANCER: NORMAL

## 2024-08-21 LAB
ACID FAST STN SPEC: NORMAL
MYCOBACTERIUM SPEC CULT: NORMAL

## 2024-08-24 NOTE — PROGRESS NOTES
Post-Hospitalization/Post-Operative Office Visit  Yannick Lock presents for his posthospitalization/postoperative visit.  The patient was recently mid to [facility] from August 11 through August 13 for cholecystitis and cholelithiasis.  Please see the admitting history and physical and discharge summary for details.    On the day of admission 8/11/2024 the patient was taken the operating room and underwent an uneventful laparoscopic cholecystectomy.  Please see the operative note for details.    Pathology revealed:    FINAL DIAGNOSIS   A. Gallbladder, cholecystectomy:  Chronic cholecystitis.with focal adenomyomatous hyperplasia.  Reactive lymph nodes.       The patient's postoperative course overall has been uneventful.  He used ibuprofen and Tylenol for a number of days postoperatively, and 2 oxycodone for breakthrough pain only.  He notes some very mild discomfort of the right upper quadrant on occasion, 1 or 2 on a 10 scale.  He has no nausea or vomiting.  He is eating well.  He has noted diarrheal bowel movements postoperatively.  He did take Metamucil preoperatively with an occasional diarrheal bowel movement but this is worsened postoperatively.  He did return to work 1 week ago 8/19/2024 and this was without incident      Vitals  There were no vitals taken for this visit.     Exam    Post Op Abdominal Physical Exam    The physical exam findings are as follows:    General  General Appearance - Not in acute distress.    Integumentary  Abdominal Incision - laparoscopic x 4, upper abdomen, including 3 centimeter supraumbilical midline  - dry, Intact, No evidence of infection, hematoma, or seroma, edges well-approximated.  No drainage present, no redness and no warmth to the touch.  Staples removed.    Chest and Lung Exam  Auscultation:  Breath sounds: - Normal and equal bilaterally.    Adventitious sounds: none    Cardiovascular  Auscultation: Rhythm - Regular. Rate - Regular.  Heart Sounds - Normal heart  sounds.    Abdomen  Inspection: - Inspection Normal.  Palpation/Percussion: Palpation and Percussion of the abdomen reveal - Non Tender, No Rebound tenderness, No Rigidity (guarding) and No Palpable abdominal masses.  Liver: - Normal.  Spleen: - Normal.  Auscultation: Auscultation of the abdomen reveals - Bowel sounds normal and No Abdominal bruits.  Surgical scars:  laparoscopic x 4, upper abdomen, including 3 centimeter supraumbilical midline      Assessment and Plan    Mr. Lock has done very well postoperatively.    Diagnosis is chronic cholecystitis with sludge.    He may have some postcholecystectomy diarrhea.     Recommendations:    No dietary or activity restrictions    May resume full activity icluding lifting exercise and core exercises    Will monitor the bowel habits; if the diarrhea continues, the patient may require a gastroenterology referral     Will also refer the patient to a PMD    Follow-up with me in 6 weeks

## 2024-08-26 ENCOUNTER — APPOINTMENT (OUTPATIENT)
Dept: SURGERY | Facility: CLINIC | Age: 59
End: 2024-08-26
Payer: MEDICARE

## 2024-08-26 VITALS
SYSTOLIC BLOOD PRESSURE: 144 MMHG | RESPIRATION RATE: 17 BRPM | TEMPERATURE: 98.7 F | WEIGHT: 234 LBS | HEART RATE: 114 BPM | HEIGHT: 67 IN | DIASTOLIC BLOOD PRESSURE: 98 MMHG | BODY MASS INDEX: 36.73 KG/M2 | OXYGEN SATURATION: 98 %

## 2024-08-26 DIAGNOSIS — K81.9 CHOLECYSTITIS WITHOUT CALCULUS: Primary | ICD-10-CM

## 2024-08-26 PROCEDURE — 1036F TOBACCO NON-USER: CPT | Performed by: SURGERY

## 2024-08-26 PROCEDURE — 99024 POSTOP FOLLOW-UP VISIT: CPT | Performed by: SURGERY

## 2024-08-28 LAB
ACID FAST STN SPEC: NORMAL
MYCOBACTERIUM SPEC CULT: NORMAL

## 2024-09-04 LAB
ACID FAST STN SPEC: NORMAL
MYCOBACTERIUM SPEC CULT: NORMAL

## 2024-09-11 LAB
ACID FAST STN SPEC: NORMAL
MYCOBACTERIUM SPEC CULT: NORMAL

## 2024-09-19 LAB
ACID FAST STN SPEC: NORMAL
MYCOBACTERIUM SPEC CULT: NORMAL

## 2024-09-25 LAB
ACID FAST STN SPEC: NORMAL
MYCOBACTERIUM SPEC CULT: NORMAL

## 2024-10-02 LAB
ACID FAST STN SPEC: NORMAL
MYCOBACTERIUM SPEC CULT: NORMAL

## 2024-10-11 NOTE — PROGRESS NOTES
Second Post-Hospitalization/Post-Operative Office Visit    Patient presents for second postoperative visit.    Patient feels very well.  He has no symptoms whatsoever.  No abdominal pain or GI symptoms.  The  postoperative diarrhea has completely resolved.      8/26/2024:  Yannick Lock presents for his posthospitalization/postoperative visit.  The patient was recently mid to [facility] from August 11 through August 13 for cholecystitis and cholelithiasis.  Please see the admitting history and physical and discharge summary for details.     On the day of admission 8/11/2024 the patient was taken the operating room and underwent an uneventful laparoscopic cholecystectomy.  Please see the operative note for details.     Pathology revealed:     FINAL DIAGNOSIS   A. Gallbladder, cholecystectomy:  Chronic cholecystitis.with focal adenomyomatous hyperplasia.  Reactive lymph nodes.        The patient's postoperative course overall has been uneventful.  He used ibuprofen and Tylenol for a number of days postoperatively, and 2 oxycodone for breakthrough pain only.  He notes some very mild discomfort of the right upper quadrant on occasion, 1 or 2 on a 10 scale.  He has no nausea or vomiting.  He is eating well.  He has noted diarrheal bowel movements postoperatively.  He did take Metamucil preoperatively with an occasional diarrheal bowel movement but this is worsened postoperatively.  He did return to work 1 week ago 8/19/2024 and this was without incident        Vitals    Left arm blood pressure 172/98  Right arm blood pressure 153/109     Exam     Post Op Abdominal Physical Exam     The physical exam findings are as follows:     General  General Appearance - Not in acute distress.     Integumentary  Abdominal Incision - laparoscopic x 4, upper abdomen, including 3 centimeter supraumbilical midline  - dry, Intact, No evidence of infection, hematoma, or seroma, edges well-approximated.  No drainage present, no redness and no  warmth to the touch.       Chest and Lung Exam  Auscultation:  Breath sounds: - Normal and equal bilaterally.    Adventitious sounds: none     Cardiovascular  Auscultation: Rhythm - Regular. Rate - Regular.  Heart Sounds - Normal heart sounds.     Abdomen  Inspection: - Inspection Normal.  Palpation/Percussion: Palpation and Percussion of the abdomen reveal - Non Tender, No Rebound tenderness, No Rigidity (guarding) and No Palpable abdominal masses.  Liver: - Normal.  Spleen: - Normal.  Auscultation: Auscultation of the abdomen reveals - Bowel sounds normal and No Abdominal bruits.  Surgical scars:  laparoscopic x 4, upper abdomen, including 3 centimeter supraumbilical midline       Assessment and Plan     Mr. Lock continues to do very well postoperatively.     Diagnosis is chronic cholecystitis with sludge.     His postoperative diarrhea has resolved.     Recommendations:     No dietary or activity restrictions     May resume full activity icluding lifting exercise and core exercises     The patient's blood pressure is elevated today in the office.  I had a long discussion with the patient regarding the importance of  monitoring and controlling the blood pressure, and having a primary care provider.  Will refer the patient to a primary care provider here at [place].  In the meantime, I recommend the patient monitor his blood pressure at home.  If this remains significantly elevated with a systolic greater than 180 and diastolic greater than 95, I recommend he go to an urgent care.     Follow-up with me as needed

## 2024-10-14 ENCOUNTER — APPOINTMENT (OUTPATIENT)
Dept: SURGERY | Facility: CLINIC | Age: 59
End: 2024-10-14
Payer: MEDICARE

## 2024-10-14 VITALS
WEIGHT: 245 LBS | BODY MASS INDEX: 38.45 KG/M2 | HEART RATE: 87 BPM | DIASTOLIC BLOOD PRESSURE: 98 MMHG | RESPIRATION RATE: 17 BRPM | SYSTOLIC BLOOD PRESSURE: 172 MMHG | OXYGEN SATURATION: 97 % | HEIGHT: 67 IN

## 2024-10-14 DIAGNOSIS — K81.9 CHOLECYSTITIS WITHOUT CALCULUS: Primary | ICD-10-CM

## 2024-10-14 PROCEDURE — 1036F TOBACCO NON-USER: CPT | Performed by: SURGERY

## 2024-10-14 PROCEDURE — 99024 POSTOP FOLLOW-UP VISIT: CPT | Performed by: SURGERY

## 2025-06-18 ENCOUNTER — TELEMEDICINE (OUTPATIENT)
Dept: URGENT CARE | Age: 60
End: 2025-06-18
Payer: MEDICARE

## 2025-06-18 ENCOUNTER — APPOINTMENT (OUTPATIENT)
Dept: URGENT CARE | Age: 60
End: 2025-06-18
Payer: MEDICARE

## 2025-06-18 DIAGNOSIS — R52 BODY ACHES: Primary | ICD-10-CM

## 2025-06-18 NOTE — PROGRESS NOTES
Pt. Presented virtually with complaints of fevers, chills, body aches as of today. No uri, uti, or gi symptoms. Pt. Prefers to be evaluated in clinic for work up. Will go into [facility]. Virtual visit will be canceled     Electronically signed by Peg Houston PA-C  4:26 PM

## 2025-06-20 ENCOUNTER — OFFICE VISIT (OUTPATIENT)
Dept: URGENT CARE | Age: 60
End: 2025-06-20
Payer: MEDICARE

## 2025-06-20 VITALS
OXYGEN SATURATION: 96 % | SYSTOLIC BLOOD PRESSURE: 152 MMHG | RESPIRATION RATE: 12 BRPM | DIASTOLIC BLOOD PRESSURE: 96 MMHG | HEART RATE: 82 BPM | TEMPERATURE: 97.2 F

## 2025-06-20 DIAGNOSIS — R50.9 FEVER, UNSPECIFIED FEVER CAUSE: ICD-10-CM

## 2025-06-20 DIAGNOSIS — B34.8 RHINOVIRUS INFECTION: Primary | ICD-10-CM

## 2025-06-20 LAB
POC CORONAVIRUS SARS-COV-2 PCR: NEGATIVE
POC HUMAN RHINOVIRUS PCR: POSITIVE
POC INFLUENZA A VIRUS PCR: NEGATIVE
POC INFLUENZA B VIRUS PCR: NEGATIVE
POC RESPIRATORY SYNCYTIAL VIRUS PCR: NEGATIVE

## 2025-06-20 ASSESSMENT — ENCOUNTER SYMPTOMS
FEVER: 1
SINUS PAIN: 0
HEADACHES: 1
SORE THROAT: 0
VOMITING: 0
FATIGUE: 1
NAUSEA: 0
COUGH: 0
DIARRHEA: 0
SINUS PRESSURE: 0
DIAPHORESIS: 1

## 2025-06-20 NOTE — PROGRESS NOTES
Subjective   Patient ID: Yannick Lock is a 60 y.o. male. They present today with a chief complaint of Generalized Body Aches and Fever (X 4 days. TD-MA).    History of Present Illness    Fever   Associated symptoms include headaches. Pertinent negatives include no congestion, coughing, diarrhea, nausea, sore throat or vomiting.       Patient presents to urgent care for complaints of body aches, fatigue and fever for 4 days. He has been using Advil which has been helping with his pain. He states he has not taken any medication this morning.     Past Medical History  Allergies as of 06/20/2025 - Reviewed 06/20/2025   Allergen Reaction Noted    Lactose Diarrhea and GI Upset 08/11/2024       Prescriptions Prior to Admission[1]     Medical History[2]    Surgical History[3]     reports that he has quit smoking. His smoking use included cigarettes. He started smoking about 15 years ago. He has never used smokeless tobacco. He reports that he does not currently use alcohol. He reports that he does not use drugs.    Review of Systems  Review of Systems   Constitutional:  Positive for diaphoresis, fatigue and fever.   HENT:  Negative for congestion, postnasal drip, sinus pressure, sinus pain, sneezing and sore throat.    Respiratory:  Negative for cough.    Gastrointestinal:  Negative for diarrhea, nausea and vomiting.   Neurological:  Positive for headaches.                                  Objective    Vitals:    06/20/25 0845   BP: (!) 152/96   Pulse: 82   Resp: 12   Temp: 36.2 °C (97.2 °F)   SpO2: 96%     No LMP for male patient.    Physical Exam  Vitals reviewed.   Constitutional:       Appearance: Normal appearance.   HENT:      Head: Normocephalic.      Right Ear: Tympanic membrane, ear canal and external ear normal.      Left Ear: Tympanic membrane, ear canal and external ear normal.      Nose: Nose normal.      Mouth/Throat:      Mouth: Mucous membranes are moist.   Eyes:      Conjunctiva/sclera: Conjunctivae normal.    Cardiovascular:      Rate and Rhythm: Normal rate and regular rhythm.      Heart sounds: Normal heart sounds.   Pulmonary:      Effort: Pulmonary effort is normal.      Breath sounds: Normal breath sounds.   Lymphadenopathy:      Cervical: No cervical adenopathy.   Skin:     General: Skin is warm and dry.   Neurological:      Mental Status: He is alert.         Procedures    Point of Care Test & Imaging Results from this visit  Results for orders placed or performed in visit on 06/20/25   POCT SPOTFIRE R/ST Panel Mini w/COVID ([facility]) manually resulted    Specimen: Swab   Result Value Ref Range    POC Sars-Cov-2 PCR Negative Negative    POC Respiratory Syncytial Virus PCR Negative Negative    POC Influenza A Virus PCR Negative Negative    POC Influenza B Virus PCR Negative Negative    POC Human Rhinovirus PCR Positive (A) Negative      Imaging  No results found.    Cardiology, Vascular, and Other Imaging  No other imaging results found for the past 2 days      Diagnostic study results (if any) were reviewed by [facility].    Assessment/Plan   Allergies, medications, history, and pertinent labs/EKGs/Imaging reviewed by MARI Abraham-CNP.     Medical Decision Making  Covid spot fire was positive for rhinovirus. Discussed with patient this is a viral etiology. Continue to use tylenol or ibuprofen as needed. Stay hydrated with fluids, if symptoms are not improving or worsening in the next 7-10 days follow up with PCP or return to urgent care if needed.     At time of discharge patient was clinically well-appearing and HDS for outpatient management. The patient and/or family was educated regarding diagnosis, supportive care, OTC and Rx medications. The patient and/or family was given the opportunity to ask questions prior to discharge.  They verbalized understanding of my discussion of the plans for treatment, expected course, indications to return to  or seek further evaluation in  ED, and the need for timely follow up as directed.   They were provided with a work/school excuse if requested.      Orders and Diagnoses  Diagnoses and all orders for this visit:  Rhinovirus infection  Fever, unspecified fever cause  -     POCT SPOTFIRE R/ST Panel Mini w/COVID ([facility]) manually resulted      Medical Admin Record      Patient disposition: Home    Electronically signed by [facility]  9:01 AM           [1] (Not in a hospital admission)  [2] No past medical history on file.  [3]   Past Surgical History:  Procedure Laterality Date    LITHOTRIPSY      OTHER SURGICAL HISTORY

## 2025-06-21 ENCOUNTER — TELEPHONE (OUTPATIENT)
Dept: URGENT CARE | Age: 60
End: 2025-06-21

## (undated) DEVICE — Device

## (undated) DEVICE — TROCAR, KII OPTICAL BLADELESS 5MM Z THREAD 100MM LNGTH

## (undated) DEVICE — DRAIN, WOUND, FLAT, JACKSON-PRATT, FULL PERFORATION, W/O TROCAR, 10 MM, SILICONE

## (undated) DEVICE — CLIP, ENDO APPLIER LIGAMAX 5MM

## (undated) DEVICE — CORD, MONOPOLAR, 10 FT, DISPOSABLE

## (undated) DEVICE — RETRIEVAL SYSTEM, MONARCH, 10MM DISP ENDOSCOPIC

## (undated) DEVICE — DISSECTOR, KITTNER, PEANUT, 5MM

## (undated) DEVICE — GRASPER TIP, LAPCLINCH, DISP

## (undated) DEVICE — SLEEVE, KII, Z-THREAD, 5X100CM

## (undated) DEVICE — RESERVOIR, DRAINAGE, WOUND, JACKSON-PRATT, 100 CC, SILICONE

## (undated) DEVICE — HOLSTER, ELECTROSURGERY ACCESSORY, STERILE

## (undated) DEVICE — TROCAR SYSTEM, BALLOON, KII GELPORT, 12 X 100MM

## (undated) DEVICE — CARE KIT, LAPAROSCOPIC, ADVANCED

## (undated) DEVICE — SCISSOR TIP, ENDOCUT, LAPAROSCOPIC

## (undated) DEVICE — ACCESS SYS, KII OPTICAL, Z-THREAD, 11X100CM

## (undated) DEVICE — CATHETER, CHOLANGIOGRAM, 4.5 FR, 45 CM, 18 IN

## (undated) DEVICE — ELECTRODE, LAPAROSCOPIC, SPATULA, 5MM X 32CM